# Patient Record
Sex: MALE | Race: ASIAN | Employment: OTHER | URBAN - METROPOLITAN AREA
[De-identification: names, ages, dates, MRNs, and addresses within clinical notes are randomized per-mention and may not be internally consistent; named-entity substitution may affect disease eponyms.]

---

## 2017-11-16 ENCOUNTER — HOSPITAL ENCOUNTER (OUTPATIENT)
Dept: CV DIAGNOSTICS | Facility: HOSPITAL | Age: 72
Discharge: HOME | End: 2017-11-16
Attending: SPECIALIST
Payer: MEDICARE

## 2017-11-16 DIAGNOSIS — I34.0 MITRAL INCOMPETENCE: ICD-10-CM

## 2017-11-16 PROCEDURE — 93306 TTE W/DOPPLER COMPLETE: CPT | Performed by: SPECIALIST

## 2017-12-22 ENCOUNTER — HOSPITAL ENCOUNTER (OUTPATIENT)
Dept: BONE DENSITY | Age: 72
Discharge: HOME | End: 2017-12-22
Attending: INTERNAL MEDICINE
Payer: MEDICARE

## 2017-12-22 DIAGNOSIS — M81.0 OSTEOPOROSIS: ICD-10-CM

## 2017-12-22 PROCEDURE — 77080 DXA BONE DENSITY AXIAL: CPT | Performed by: INTERNAL MEDICINE

## 2018-06-26 ENCOUNTER — HOSPITAL ENCOUNTER (OUTPATIENT)
Dept: CV DIAGNOSTICS | Facility: HOSPITAL | Age: 73
Discharge: HOME | End: 2018-06-26
Attending: FAMILY MEDICINE
Payer: MEDICARE

## 2018-06-26 DIAGNOSIS — R06.02 SHORTNESS OF BREATH: ICD-10-CM

## 2018-06-26 DIAGNOSIS — R07.9 CHEST PAIN: ICD-10-CM

## 2018-06-26 PROCEDURE — 93306 TTE W/DOPPLER COMPLETE: CPT | Performed by: FAMILY MEDICINE

## 2019-09-27 ENCOUNTER — HOSPITAL ENCOUNTER (OUTPATIENT)
Dept: CV DIAGNOSTICS | Facility: HOSPITAL | Age: 74
Discharge: HOME | End: 2019-09-27
Attending: THORACIC SURGERY (CARDIOTHORACIC VASCULAR SURGERY)
Payer: MEDICARE

## 2019-09-27 DIAGNOSIS — I07.1 TRICUSPID REGURGITATION: ICD-10-CM

## 2019-09-27 PROCEDURE — 93306 TTE W/DOPPLER COMPLETE: CPT | Performed by: THORACIC SURGERY (CARDIOTHORACIC VASCULAR SURGERY)

## 2019-12-19 ENCOUNTER — HOSPITAL ENCOUNTER (OUTPATIENT)
Dept: CV DIAGNOSTICS | Facility: HOSPITAL | Age: 74
Discharge: HOME | End: 2019-12-19
Attending: SPECIALIST
Payer: MEDICARE

## 2019-12-19 DIAGNOSIS — Z95.2 S/P MVR (MITRAL VALVE REPLACEMENT): ICD-10-CM

## 2019-12-19 DIAGNOSIS — I25.10 CAD (CORONARY ARTERY DISEASE): ICD-10-CM

## 2019-12-19 PROCEDURE — 78452 HT MUSCLE IMAGE SPECT MULT: CPT | Performed by: SPECIALIST

## 2019-12-19 PROCEDURE — 93018 CV STRESS TEST I&R ONLY: CPT | Performed by: SPECIALIST

## 2019-12-19 PROCEDURE — 93017 CV STRESS TEST TRACING ONLY: CPT | Performed by: SPECIALIST

## 2020-09-30 ENCOUNTER — HOSPITAL ENCOUNTER (OUTPATIENT)
Dept: CV DIAGNOSTICS | Facility: HOSPITAL | Age: 75
Discharge: HOME | End: 2020-09-30
Attending: SPECIALIST
Payer: MEDICARE

## 2020-09-30 DIAGNOSIS — I34.0 MITRAL VALVE INSUFFICIENCY, UNSPECIFIED ETIOLOGY: ICD-10-CM

## 2020-09-30 PROCEDURE — 93306 TTE W/DOPPLER COMPLETE: CPT | Performed by: SPECIALIST

## 2021-09-20 ENCOUNTER — HOSPITAL ENCOUNTER (OUTPATIENT)
Dept: CV DIAGNOSTICS | Facility: HOSPITAL | Age: 76
Discharge: HOME | End: 2021-09-20
Attending: SPECIALIST
Payer: MEDICARE

## 2021-09-20 DIAGNOSIS — Z98.890 S/P MVR (MITRAL VALVE REPAIR): ICD-10-CM

## 2021-09-20 DIAGNOSIS — Z95.4 PRESENCE OF OTHER HEART-VALVE REPLACEMENT: ICD-10-CM

## 2021-09-20 PROCEDURE — 93306 TTE W/DOPPLER COMPLETE: CPT | Performed by: SPECIALIST

## 2021-09-22 ENCOUNTER — HOSPITAL ENCOUNTER (OUTPATIENT)
Dept: CV DIAGNOSTICS | Facility: HOSPITAL | Age: 76
Discharge: HOME | End: 2021-09-22
Attending: SPECIALIST
Payer: MEDICARE

## 2021-09-22 DIAGNOSIS — Z98.890 S/P MVR (MITRAL VALVE REPAIR): ICD-10-CM

## 2021-09-22 DIAGNOSIS — R06.02 SOB (SHORTNESS OF BREATH): ICD-10-CM

## 2021-09-22 DIAGNOSIS — I10 HTN (HYPERTENSION): ICD-10-CM

## 2021-09-22 PROCEDURE — 78452 HT MUSCLE IMAGE SPECT MULT: CPT | Performed by: SPECIALIST

## 2021-09-22 PROCEDURE — 93017 CV STRESS TEST TRACING ONLY: CPT | Performed by: SPECIALIST

## 2021-09-22 PROCEDURE — 93018 CV STRESS TEST I&R ONLY: CPT | Performed by: SPECIALIST

## 2023-06-15 ENCOUNTER — OFFICE VISIT (OUTPATIENT)
Dept: FAMILY MEDICINE CLINIC | Facility: CLINIC | Age: 78
End: 2023-06-15
Payer: MEDICARE

## 2023-06-15 VITALS
WEIGHT: 162 LBS | HEART RATE: 66 BPM | RESPIRATION RATE: 18 BRPM | DIASTOLIC BLOOD PRESSURE: 64 MMHG | BODY MASS INDEX: 26.03 KG/M2 | TEMPERATURE: 97 F | HEIGHT: 66 IN | SYSTOLIC BLOOD PRESSURE: 130 MMHG | OXYGEN SATURATION: 100 %

## 2023-06-15 DIAGNOSIS — J02.9 SORE THROAT: ICD-10-CM

## 2023-06-15 DIAGNOSIS — R09.89 RUNNY NOSE: ICD-10-CM

## 2023-06-15 DIAGNOSIS — R05.1 ACUTE COUGH: Primary | ICD-10-CM

## 2023-06-15 PROBLEM — I10 ESSENTIAL HYPERTENSION, BENIGN: Status: ACTIVE | Noted: 2019-06-03

## 2023-06-15 PROBLEM — D62 ACUTE BLOOD LOSS AS CAUSE OF POSTOPERATIVE ANEMIA: Status: ACTIVE | Noted: 2019-06-04

## 2023-06-15 PROBLEM — N40.1 LOWER URINARY TRACT SYMPTOMS DUE TO BENIGN PROSTATIC HYPERPLASIA: Status: ACTIVE | Noted: 2023-06-15

## 2023-06-15 PROBLEM — K21.9 GASTROESOPHAGEAL REFLUX DISEASE WITHOUT ESOPHAGITIS: Status: ACTIVE | Noted: 2019-06-03

## 2023-06-15 PROBLEM — H57.89 IRRITATION OF BOTH EYES: Status: ACTIVE | Noted: 2019-06-03

## 2023-06-15 PROBLEM — I25.10 ATHEROSCLEROSIS OF CORONARY ARTERY: Status: ACTIVE | Noted: 2023-06-15

## 2023-06-15 PROBLEM — R39.11 HESITANCY OF MICTURITION: Status: ACTIVE | Noted: 2023-06-15

## 2023-06-15 PROBLEM — E55.9 VITAMIN D DEFICIENCY: Status: ACTIVE | Noted: 2023-06-15

## 2023-06-15 PROBLEM — E78.5 HYPERLIPIDEMIA: Status: ACTIVE | Noted: 2023-06-15

## 2023-06-15 PROBLEM — N40.0 BENIGN PROSTATIC HYPERPLASIA: Status: ACTIVE | Noted: 2023-06-15

## 2023-06-15 PROBLEM — M54.30 SCIATICA: Status: ACTIVE | Noted: 2023-06-15

## 2023-06-15 LAB
CONTROL LINE PRESENT WITH A CLEAR BACKGROUND (YES/NO): YES YES/NO
KIT LOT #: NORMAL NUMERIC
STREP GRP A CUL-SCR: NEGATIVE

## 2023-06-15 PROCEDURE — 87635 SARS-COV-2 COVID-19 AMP PRB: CPT | Performed by: NURSE PRACTITIONER

## 2023-06-15 PROCEDURE — 87880 STREP A ASSAY W/OPTIC: CPT | Performed by: NURSE PRACTITIONER

## 2023-06-15 PROCEDURE — 99202 OFFICE O/P NEW SF 15 MIN: CPT | Performed by: NURSE PRACTITIONER

## 2023-06-15 RX ORDER — AZITHROMYCIN 250 MG/1
TABLET, FILM COATED ORAL
Qty: 6 TABLET | Refills: 0 | Status: SHIPPED | OUTPATIENT
Start: 2023-06-15 | End: 2023-06-20

## 2023-06-16 DIAGNOSIS — Z20.822 ENCOUNTER FOR LABORATORY TESTING FOR COVID-19 VIRUS: Primary | ICD-10-CM

## 2023-06-16 LAB — SARS-COV-2 RNA RESP QL NAA+PROBE: DETECTED

## 2023-12-21 ENCOUNTER — HOSPITAL ENCOUNTER (OUTPATIENT)
Dept: GENERAL RADIOLOGY | Age: 78
Discharge: HOME | End: 2023-12-21
Payer: MEDICARE

## 2023-12-21 DIAGNOSIS — R52 PAIN: ICD-10-CM

## 2023-12-21 PROCEDURE — 71101 X-RAY EXAM UNILAT RIBS/CHEST: CPT

## 2024-01-26 ENCOUNTER — HOSPITAL ENCOUNTER (INPATIENT)
Facility: HOSPITAL | Age: 79
LOS: 4 days | Discharge: HOME HEALTH | End: 2024-01-30
Attending: EMERGENCY MEDICINE | Admitting: HOSPITALIST
Payer: MEDICARE

## 2024-01-26 ENCOUNTER — APPOINTMENT (OUTPATIENT)
Dept: GENERAL RADIOLOGY | Facility: HOSPITAL | Age: 79
End: 2024-01-26
Payer: MEDICARE

## 2024-01-26 DIAGNOSIS — E87.1 HYPONATREMIA: ICD-10-CM

## 2024-01-26 DIAGNOSIS — J18.9 COMMUNITY ACQUIRED PNEUMONIA OF RIGHT LUNG, UNSPECIFIED PART OF LUNG: Primary | ICD-10-CM

## 2024-01-26 DIAGNOSIS — M54.30 SCIATICA, UNSPECIFIED LATERALITY: ICD-10-CM

## 2024-01-26 PROBLEM — K21.9 GASTROESOPHAGEAL REFLUX DISEASE: Status: ACTIVE | Noted: 2019-06-03

## 2024-01-26 PROBLEM — I10 BENIGN ESSENTIAL HTN: Status: ACTIVE | Noted: 2019-06-03

## 2024-01-26 PROBLEM — E78.5 DYSLIPIDEMIA: Status: ACTIVE | Noted: 2023-06-15

## 2024-01-26 LAB
ALBUMIN SERPL-MCNC: 2.7 G/DL (ref 3.4–5)
ALBUMIN/GLOB SERPL: 0.6 {RATIO} (ref 1–2)
ALP LIVER SERPL-CCNC: 123 U/L
ANION GAP SERPL CALC-SCNC: 7 MMOL/L (ref 0–18)
ANION GAP SERPL CALC-SCNC: 7 MMOL/L (ref 0–18)
AST SERPL-CCNC: 65 U/L (ref 15–37)
BASOPHILS # BLD AUTO: 0.03 X10(3) UL (ref 0–0.2)
BASOPHILS NFR BLD AUTO: 0.3 %
BILIRUB SERPL-MCNC: 0.8 MG/DL (ref 0.1–2)
BUN BLD-MCNC: 18 MG/DL (ref 9–23)
BUN BLD-MCNC: 20 MG/DL (ref 9–23)
CALCIUM BLD-MCNC: 8.5 MG/DL (ref 8.5–10.1)
CALCIUM BLD-MCNC: 9.1 MG/DL (ref 8.5–10.1)
CHLORIDE SERPL-SCNC: 85 MMOL/L (ref 98–112)
CHLORIDE SERPL-SCNC: 87 MMOL/L (ref 98–112)
CO2 SERPL-SCNC: 25 MMOL/L (ref 21–32)
CO2 SERPL-SCNC: 25 MMOL/L (ref 21–32)
CREAT BLD-MCNC: 0.92 MG/DL
CREAT BLD-MCNC: 0.94 MG/DL
EGFRCR SERPLBLD CKD-EPI 2021: 83 ML/MIN/1.73M2 (ref 60–?)
EGFRCR SERPLBLD CKD-EPI 2021: 86 ML/MIN/1.73M2 (ref 60–?)
EOSINOPHIL # BLD AUTO: 0.13 X10(3) UL (ref 0–0.7)
EOSINOPHIL NFR BLD AUTO: 1.2 %
ERYTHROCYTE [DISTWIDTH] IN BLOOD BY AUTOMATED COUNT: 14.3 %
FLUAV + FLUBV RNA SPEC NAA+PROBE: NEGATIVE
FLUAV + FLUBV RNA SPEC NAA+PROBE: NEGATIVE
GLOBULIN PLAS-MCNC: 4.5 G/DL (ref 2.8–4.4)
GLUCOSE BLD-MCNC: 79 MG/DL (ref 70–99)
GLUCOSE BLD-MCNC: 80 MG/DL (ref 70–99)
HCT VFR BLD AUTO: 29.4 %
HGB BLD-MCNC: 10.2 G/DL
IMM GRANULOCYTES # BLD AUTO: 0.18 X10(3) UL (ref 0–1)
IMM GRANULOCYTES NFR BLD: 1.6 %
L PNEUMO AG UR QL: NEGATIVE
LACTATE SERPL-SCNC: 1.2 MMOL/L (ref 0.4–2)
LYMPHOCYTES # BLD AUTO: 0.75 X10(3) UL (ref 1–4)
LYMPHOCYTES NFR BLD AUTO: 6.7 %
MCH RBC QN AUTO: 27.1 PG (ref 26–34)
MCHC RBC AUTO-ENTMCNC: 34.7 G/DL (ref 31–37)
MCV RBC AUTO: 78 FL
MONOCYTES # BLD AUTO: 0.57 X10(3) UL (ref 0.1–1)
MONOCYTES NFR BLD AUTO: 5.1 %
MRSA DNA SPEC QL NAA+PROBE: NEGATIVE
NEUTROPHILS # BLD AUTO: 9.58 X10 (3) UL (ref 1.5–7.7)
NEUTROPHILS # BLD AUTO: 9.58 X10(3) UL (ref 1.5–7.7)
NEUTROPHILS NFR BLD AUTO: 85.1 %
NT-PROBNP SERPL-MCNC: 4037 PG/ML (ref ?–450)
OSMOLALITY SERPL CALC.SUM OF ELEC: 246 MOSM/KG (ref 275–295)
OSMOLALITY SERPL CALC.SUM OF ELEC: 249 MOSM/KG (ref 275–295)
OSMOLALITY UR: 359 MOSM/KG (ref 300–1300)
PLATELET # BLD AUTO: 278 10(3)UL (ref 150–450)
POTASSIUM SERPL-SCNC: 4.1 MMOL/L (ref 3.5–5.1)
POTASSIUM SERPL-SCNC: 4.4 MMOL/L (ref 3.5–5.1)
PROCALCITONIN SERPL-MCNC: 0.08 NG/ML (ref ?–0.16)
PROT SERPL-MCNC: 7.2 G/DL (ref 6.4–8.2)
RBC # BLD AUTO: 3.77 X10(6)UL
RSV RNA SPEC NAA+PROBE: NEGATIVE
SARS-COV-2 RNA RESP QL NAA+PROBE: NOT DETECTED
SODIUM SERPL-SCNC: 117 MMOL/L (ref 136–145)
SODIUM SERPL-SCNC: 119 MMOL/L (ref 136–145)
SODIUM SERPL-SCNC: 43 MMOL/L
STREP PNEUMO ANTIGEN, URINE: NEGATIVE
TROPONIN I SERPL HS-MCNC: 19 NG/L
WBC # BLD AUTO: 11.2 X10(3) UL (ref 4–11)

## 2024-01-26 PROCEDURE — 71045 X-RAY EXAM CHEST 1 VIEW: CPT | Performed by: EMERGENCY MEDICINE

## 2024-01-26 PROCEDURE — 99223 1ST HOSP IP/OBS HIGH 75: CPT | Performed by: INTERNAL MEDICINE

## 2024-01-26 RX ORDER — ACETAMINOPHEN 500 MG
500 TABLET ORAL EVERY 4 HOURS PRN
Status: DISCONTINUED | OUTPATIENT
Start: 2024-01-26 | End: 2024-01-30

## 2024-01-26 RX ORDER — ONDANSETRON 2 MG/ML
4 INJECTION INTRAMUSCULAR; INTRAVENOUS EVERY 4 HOURS PRN
Status: DISCONTINUED | OUTPATIENT
Start: 2024-01-26 | End: 2024-01-26 | Stop reason: ALTCHOICE

## 2024-01-26 RX ORDER — ENALAPRIL MALEATE 5 MG/1
5 TABLET ORAL DAILY
Status: DISCONTINUED | OUTPATIENT
Start: 2024-01-27 | End: 2024-01-29

## 2024-01-26 RX ORDER — TIZANIDINE 4 MG/1
4 TABLET ORAL 3 TIMES DAILY PRN
Status: DISCONTINUED | OUTPATIENT
Start: 2024-01-26 | End: 2024-01-30

## 2024-01-26 RX ORDER — HEPARIN SODIUM 5000 [USP'U]/ML
5000 INJECTION, SOLUTION INTRAVENOUS; SUBCUTANEOUS EVERY 8 HOURS SCHEDULED
Status: DISCONTINUED | OUTPATIENT
Start: 2024-01-26 | End: 2024-01-30

## 2024-01-26 RX ORDER — PANTOPRAZOLE SODIUM 40 MG/1
40 TABLET, DELAYED RELEASE ORAL
Status: DISCONTINUED | OUTPATIENT
Start: 2024-01-27 | End: 2024-01-30

## 2024-01-26 RX ORDER — DOXEPIN HYDROCHLORIDE 50 MG/1
1 CAPSULE ORAL DAILY
Status: DISCONTINUED | OUTPATIENT
Start: 2024-01-26 | End: 2024-01-30

## 2024-01-26 RX ORDER — ENEMA 19; 7 G/133ML; G/133ML
1 ENEMA RECTAL ONCE AS NEEDED
Status: DISCONTINUED | OUTPATIENT
Start: 2024-01-26 | End: 2024-01-30

## 2024-01-26 RX ORDER — SODIUM CHLORIDE 9 MG/ML
INJECTION, SOLUTION INTRAVENOUS CONTINUOUS
Status: DISCONTINUED | OUTPATIENT
Start: 2024-01-26 | End: 2024-01-29

## 2024-01-26 RX ORDER — ATORVASTATIN CALCIUM 40 MG/1
40 TABLET, FILM COATED ORAL NIGHTLY
Status: DISCONTINUED | OUTPATIENT
Start: 2024-01-26 | End: 2024-01-30

## 2024-01-26 RX ORDER — SODIUM CHLORIDE 9 MG/ML
INJECTION, SOLUTION INTRAVENOUS ONCE
Status: COMPLETED | OUTPATIENT
Start: 2024-01-26 | End: 2024-01-26

## 2024-01-26 RX ORDER — MELATONIN 10 MG
10 CAPSULE ORAL NIGHTLY PRN
COMMUNITY

## 2024-01-26 RX ORDER — BISACODYL 10 MG
10 SUPPOSITORY, RECTAL RECTAL
Status: DISCONTINUED | OUTPATIENT
Start: 2024-01-26 | End: 2024-01-30

## 2024-01-26 RX ORDER — SENNOSIDES 8.6 MG
17.2 TABLET ORAL NIGHTLY PRN
Status: DISCONTINUED | OUTPATIENT
Start: 2024-01-26 | End: 2024-01-30

## 2024-01-26 RX ORDER — METOCLOPRAMIDE HYDROCHLORIDE 5 MG/ML
10 INJECTION INTRAMUSCULAR; INTRAVENOUS EVERY 8 HOURS PRN
Status: DISCONTINUED | OUTPATIENT
Start: 2024-01-26 | End: 2024-01-30

## 2024-01-26 RX ORDER — SODIUM CHLORIDE 9 MG/ML
125 INJECTION, SOLUTION INTRAVENOUS CONTINUOUS
Status: DISCONTINUED | OUTPATIENT
Start: 2024-01-26 | End: 2024-01-26

## 2024-01-26 RX ORDER — ONDANSETRON 2 MG/ML
4 INJECTION INTRAMUSCULAR; INTRAVENOUS EVERY 6 HOURS PRN
Status: DISCONTINUED | OUTPATIENT
Start: 2024-01-26 | End: 2024-01-30

## 2024-01-26 RX ORDER — POLYETHYLENE GLYCOL 3350 17 G/17G
17 POWDER, FOR SOLUTION ORAL DAILY PRN
Status: DISCONTINUED | OUTPATIENT
Start: 2024-01-26 | End: 2024-01-30

## 2024-01-26 NOTE — H&P
[facility]     Sureshchan P Gondalia Patient Status:  Emergency    1945 MRN [id number]   Location [facility] EMERGENCY DEPARTMENT Attending Choco Woods*   Hosp Day # 0 PCP Nannette Maza MD     Chief Complaint: SOB    Subjective:    History of Present Illness:     Sureshchan P Gondalia is a 78 year old male with PMhx of DL, HTN, presents with SOB. Pt has had symptoms for the past few weeks. He has some assocaited dizziness. He denies any CP. He has cough as well. No F/C. No N/V/D/C or abd pain. He has not been eating much over the past few days. He states he had some rice and soup yesterday and some ensure.  No recent travel or sick contacts he is aware of. He denies any focal weakness, numbness or tingling. No vision changes or headaches. He is very weak at home and has not been ambulating much. He denies any leg edema.     History/Other:    Past Medical History:  Past Medical History:   Diagnosis Date    Arthritis     Back pain     Bloating     Flatulence/gas pain/belching     Frequent urination     Frequent use of laxatives     Heartburn     High cholesterol     History of cardiac murmur     Indigestion     Other and unspecified hyperlipidemia     Pain in joints     Presence of other cardiac implants and grafts     Shortness of breath     Unspecified essential hypertension     Wears glasses      Past Surgical History:   History reviewed. No pertinent surgical history.   Family History:   No family history on file.  Social History:    reports that he has never smoked. He has never used smokeless tobacco. He reports that he does not drink alcohol and does not use drugs.     Allergies:   Allergies   Allergen Reactions    Sulfa Antibiotics        Medications:    No current facility-administered medications on file prior to encounter.     Current Outpatient Medications on File Prior to Encounter   Medication Sig Dispense Refill    PANTOPRAZOLE 40 MG Oral Tab EC  TAKE 1 TABLET BY MOUTH TWO TIMES A DAY 30 MINUTES BEFORE MEALS 180 tablet 0    atorvastatin 40 MG Oral Tab Take 1 tablet (40 mg total) by mouth As Directed.      Cholecalciferol 125 MCG (5000 UT) Oral Tab Take 1 tablet (5,000 Units total) by mouth 2 (two) times daily.      Coenzyme Q10 300 MG Oral Cap Take by mouth.      LORazepam 1 MG Oral Tab Take 1 mg by mouth as needed.      Meloxicam 15 MG Oral Tab       Multiple Vitamins-Minerals (CENTRUM ADULTS) Oral Tab Take by mouth daily.      tiZANidine HCl 4 MG Oral Cap Take 4 mg by mouth 3 (three) times daily.      Enalapril Maleate (VASOTEC) 5 MG Oral Tab Take 1 tablet (5 mg total) by mouth daily.      Omega-3-acid Ethyl Esters 1 g Oral Cap Take 2 capsules (2 g total) by mouth 2 (two) times daily.      Esomeprazole Magnesium (NEXIUM) 40 MG Oral Capsule Delayed Release Take 1 capsule (40 mg total) by mouth every morning before breakfast.         Review of Systems:   A comprehensive review of systems was completed.    Pertinent positives and negatives noted in the HPI.    Objective:   Physical Exam:    BP (!) 125/105   Pulse 72   Temp 98.1 °F (36.7 °C) (Oral)   Resp 17   Ht 167.6 cm (5' 6\")   Wt 162 lb (73.5 kg)   SpO2 95%   BMI 26.15 kg/m²   General: No acute distress, Alert  Respiratory: R sided crackles  Cardiovascular: S1, S2. Regular rate and rhythm  Abdomen: Soft, Non-tender, non-distended, positive bowel sounds  Neuro: No new focal deficits  Extremities: No edema      Results:    Labs:      Labs Last 24 Hours:    Recent Labs   Lab 01/26/24  1536   RBC 3.77*   HGB 10.2*   HCT 29.4*   MCV 78.0*   MCH 27.1   MCHC 34.7   RDW 14.3   NEPRELIM 9.58*   WBC 11.2*   .0       Recent Labs   Lab 01/26/24  1536   GLU 80   BUN 20   CREATSERUM 0.94   EGFRCR 83   CA 9.1   ALB 2.7*   *   K 4.4   CL 85*   CO2 25.0   ALKPHO 123*   AST 65*   BILT 0.8   TP 7.2       Lab Results   Component Value Date    PT 16.0 (H) 06/16/2012    PT 18.8 (H) 06/15/2012    PT 20.6 (H)  06/14/2012    INR 1.32 (H) 06/16/2012    INR 1.63 (H) 06/15/2012    INR 1.83 (H) 06/14/2012       Recent Labs   Lab 01/26/24  1536   TROPHS 19       Recent Labs   Lab 01/26/24  1536   PBNP 4,037*       No results for input(s): \"PCT\" in the last 168 hours.    Imaging: Imaging data reviewed in Epic.    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 Please be advised this is a medical document. Medical documents are intended to carry relevant information, facts as evident, and the clinical opinion of the practitioner. The medical note is intended as peer to peer communication and may appear blunt or direct. It is written in medical language and may contain abbreviations or verbiage that are unfamiliar.

## 2024-01-26 NOTE — ED PROVIDER NOTES
Patient Seen in: [facility] Emergency Department      History     Chief Complaint   Patient presents with    Difficulty Breathing    Lightheadedness    Weakness     Stated Complaint: sob, lighheaded, generalized weakness    Subjective:   HPI    78-year-old male presents today for evaluation.  For the past 2 weeks, patient thought he had a cold.  He has had a persistent dry cough.  Over the last several days, patient has been feeling more weak.  Daughter contributes that he has been having a hard time getting up and moving around.  Patient states he feels short of breath, usually worse with exertion.  He does not have any fevers, chest pain or hemoptysis.  He does not have any leg swelling either.  Because of his weakness, he is brought in for evaluation.    Objective:   Past Medical History:   Diagnosis Date    Anxiety state     Arrhythmia     Arthritis     Back pain     Back problem     Bloating     Depression     Esophageal reflux     Flatulence/gas pain/belching     Frequent urination     Frequent use of laxatives     Heartburn     High cholesterol     History of blood transfusion     History of cardiac murmur     Indigestion     Muscle weakness     Osteoarthritis     Other and unspecified hyperlipidemia     Pain in joints     Presence of other cardiac implants and grafts     Renal disorder     Shortness of breath     Unspecified essential hypertension     Visual impairment     Wears glasses               History reviewed. No pertinent surgical history.             Social History     Socioeconomic History    Marital status:    Tobacco Use    Smoking status: Never    Smokeless tobacco: Never   Vaping Use    Vaping Use: Never used   Substance and Sexual Activity    Alcohol use: Never    Drug use: Never     Social Determinants of Health     Food Insecurity: No Food Insecurity (1/26/2024)    Food Insecurity     Food Insecurity: Never true   Transportation Needs: No Transportation Needs (1/26/2024)     Transportation Needs     Lack of Transportation: No   Housing Stability: Low Risk  (1/26/2024)    Housing Stability     Housing Instability: No              Review of Systems    Positive for stated complaint: sob, lighheaded, generalized weakness  Other systems are as noted in HPI.  Constitutional and vital signs reviewed.      All other systems reviewed and negative except as noted above.    Physical Exam     ED Triage Vitals [01/26/24 1510]   BP (!) 170/83   Pulse 78   Resp 20   Temp 98.1 °F (36.7 °C)   Temp src Oral   SpO2 100 %   O2 Device None (Room air)       Current:/76 (BP Location: Right arm)   Pulse 68   Temp 97.9 °F (36.6 °C) (Oral)   Resp 18   Ht 167.6 cm (5' 6\")   Wt 74.9 kg   SpO2 100%   BMI 26.65 kg/m²         Physical Exam  Vitals and nursing note reviewed.   Constitutional:       Appearance: Normal appearance.   HENT:      Head: Normocephalic.      Nose: Nose normal.      Mouth/Throat:      Mouth: Mucous membranes are moist.   Eyes:      Extraocular Movements: Extraocular movements intact.   Cardiovascular:      Rate and Rhythm: Normal rate.   Pulmonary:      Effort: Pulmonary effort is normal.      Comments: Right basilar crackles, lungs are otherwise clear.  Abdominal:      General: Abdomen is flat.   Musculoskeletal:         General: Normal range of motion.      Right lower leg: No edema.      Left lower leg: No edema.   Skin:     General: Skin is warm.   Neurological:      General: No focal deficit present.      Mental Status: He is alert.   Psychiatric:         Mood and Affect: Mood normal.         ED Course     Labs Reviewed   COMP METABOLIC PANEL (14) - Abnormal; Notable for the following components:       Result Value    Sodium 117 (*)     Chloride 85 (*)     Calculated Osmolality 246 (*)     AST 65 (*)     Alkaline Phosphatase 123 (*)     Albumin 2.7 (*)     Globulin  4.5 (*)     A/G Ratio 0.6 (*)     All other components within normal limits   PRO BETA NATRIURETIC PEPTIDE -  Abnormal; Notable for the following components:    Pro-Beta Natriuretic Peptide 4,037 (*)     All other components within normal limits   BASIC METABOLIC PANEL (8) - Abnormal; Notable for the following components:    Sodium 119 (*)     Chloride 87 (*)     Calculated Osmolality 249 (*)     All other components within normal limits   CBC W/ DIFFERENTIAL - Abnormal; Notable for the following components:    WBC 11.2 (*)     RBC 3.77 (*)     HGB 10.2 (*)     HCT 29.4 (*)     MCV 78.0 (*)     Neutrophil Absolute Prelim 9.58 (*)     Neutrophil Absolute 9.58 (*)     Lymphocyte Absolute 0.75 (*)     All other components within normal limits   LACTIC ACID, PLASMA - Normal   TROPONIN I HIGH SENSITIVITY - Normal   PROCALCITONIN - Normal    Narrative:     Resulted by: batch: TNIH, BRYANT,    LEGIONELLA URINE AG SEROGRP 1 - Normal    Narrative:     Presumptive negative for Legionella pneumophila serogroup 1 antigen in urine, suggesting no recent or current infection. Infection due to Legionella cannot be ruled out since other serogroups and species may cause disease, antigen may not be present in early infection, or the level of antigen present in the urine may be below the detection limit of the test.   STREPTOCOCCUS PNEUMONIAE AG, URINE - Normal    Narrative:     Presumptive negative. A negative result does not exclude infection with Streptococcus pneumoniae. The result of this test as well as culture results should be used in conjunction with clinical findings to make an accurate diagnosis.   OSMOLALITY, URINE - Normal   SARS-COV-2/FLU A AND B/RSV BY PCR (GENEXP[employer]) - Normal    Narrative:     This test is intended for the qualitative detection and differentiation of SARS-CoV-2, influenza A, influenza B, and respiratory syncytial virus (RSV) viral RNA in nasopharyngeal or nares swabs from individuals suspected of respiratory viral infection consistent with COVID-19 by their healthcare provider. Signs and symptoms of respiratory  viral infection due to SARS-CoV-2, influenza, and RSV can be similar.    Test performed using the Xpert Xpress SARS-CoV-2/FLU/RSV (real time RT-PCR)  assay on the [employer] instrument, [address], [place] [address].   This test is being used under the Food and Drug Administration's Emergency Use Authorization.    The authorized Fact Sheet for Healthcare Providers for this assay is available upon request from the laboratory.   ED/MRSA SCREEN BY PCR-CC - Normal   CBC WITH DIFFERENTIAL WITH PLATELET    Narrative:     The following orders were created for panel order CBC With Differential With Platelet.  Procedure                               Abnormality         Status                     ---------                               -----------         ------                     CBC W/ DIFFERENTIAL[[id number]]          Abnormal            Final result                 Please view results for these tests on the individual orders.   SODIUM, URINE, RANDOM   COMP METABOLIC PANEL (14)   CBC WITH DIFFERENTIAL WITH PLATELET   BASIC METABOLIC PANEL (8)   BASIC METABOLIC PANEL (8)   BASIC METABOLIC PANEL (8)   BASIC METABOLIC PANEL (8)   BLOOD CULTURE   BLOOD CULTURE   SPUTUM CULTURE     EKG    Rate, intervals and axes as noted on EKG Report.  Rate: 73  Rhythm: Atrial Fibrillation  Reading: No STEMI                 XR CHEST AP PORTABLE  (CPT=71045)    Result Date: 1/26/2024  PROCEDURE:  XR CHEST AP PORTABLE  (CPT=71045)  TECHNIQUE:  AP chest radiograph was obtained.  COMPARISON:  [address], XR, XR RIBS WITH CHEST (3 VIEWS), RIGHT  (CPT=71101), 12/21/2023, 1:09 PM.  INDICATIONS:  sob, lighheaded, generalized weakness  PATIENT STATED HISTORY: (As transcribed by Technologist)  Patient stated having shortness of breath today.    FINDINGS:  There is marked cardiomegaly with mild pulmonary vascular congestion.  There are increased interstitial markings in the lungs, right greater than left.  There is patchy airspace consolidation in the  mid to lower right lung.  The overall findings are most concerning for pneumonia in the right lung, with changes of asymmetric pulmonary edema from congestive failure not entirely excluded.  Clinical correlation recommended.  Minimal right pleural effusion.  No pneumothorax.  Degenerative changes the spine.  Median sternotomy changes noted.            CONCLUSION:  There is marked cardiomegaly with mild pulmonary vascular congestion.  There are increased interstitial markings in the lungs, right greater than left.  There is patchy airspace consolidation in the mid to lower right lung.  The overall findings are most concerning for pneumonia in the right lung, with changes of asymmetric pulmonary edema from congestive failure not entirely excluded.  Clinical correlation recommended.  Minimal right pleural effusion.    LOCATION:  [facility]      Dictated by (CST): Abdiaziz Lombardi MD on 1/26/2024 at 4:33 PM     Finalized by (CST): Abdiaziz Lombardi MD on 1/26/2024 at 4:34 PM               MDM      Differential Diagnosis  78-year-old male presents today for evaluation of several weeks of a cough that is now progressed to generalized weakness and shortness of breath with exertion.  He is satting well on room air and has no increased work of breathing.  On auscultation, he does have right basilar crackles.  Differential would include bronchitis, pneumonia, CHF, acute coronary syndrome.  Plan for chest x-ray along with labs.  Will give fluids and reassess.    4:39 pm  Patient noted to have critical hyponatremia.  I reviewed case with Dr. Hoffman of nephrology.  Patient had only received about 50 mL of normal saline.  Recommendation was to place him on a rate of 75 ml/hr and fluid restrict, urine sodium and osm ordered at request.  Chest x-ray demonstrates a right-sided infiltrate, and concern for pneumonia.  IV antibiotics ordered.    6:09 pm  Patient remains hemodynamically stable and well-appearing.  Will admit to the hospital  for continued care.  I spoke with the hospitalist in regards to admission.    A total of 30 minutes of critical care time (exclusive of billable procedures) was administered to manage the patient's critical lab values due to his hyponatremia.  This involved direct patient intervention, complex decision making, and/or extensive discussions with the patient, family, and clinical staff.    History from [facility]  Daughter helps supplement history    Discussions of Management  Case reviewed with nephrology along with the hospitalist    Independent Interpretation  I independently interpreted the x-ray, patient with a right-sided infiltrate  Admission disposition: 1/26/2024  6:09 PM                                        Medical Decision Making      Disposition and Plan     Clinical Impression:  1. Community acquired pneumonia of right lung, unspecified part of lung    2. Hyponatremia         Disposition:  Admit  1/26/2024  6:09 pm    Follow-up:  No follow-up provider specified.        Medications Prescribed:  Current Discharge Medication List                            Hospital Problems       Present on Admission  Date Reviewed: 6/15/2023            ICD-10-CM Noted POA    * (Principal) Community acquired pneumonia of right lung, unspecified part of lung J18.9 1/26/2024 Unknown    Benign essential HTN I10 6/3/2019 Yes    Dyslipidemia E78.5 6/15/2023 Yes    Gastroesophageal reflux disease K21.9 6/3/2019 Yes    Hyponatremia E87.1 1/26/2024 Unknown

## 2024-01-27 PROBLEM — E22.2 SIADH (SYNDROME OF INAPPROPRIATE ADH PRODUCTION) (HCC): Status: ACTIVE | Noted: 2024-01-27

## 2024-01-27 LAB
ADENOVIRUS PCR:: NOT DETECTED
ALBUMIN SERPL-MCNC: 2.4 G/DL (ref 3.4–5)
ALBUMIN/GLOB SERPL: 0.6 {RATIO} (ref 1–2)
ALP LIVER SERPL-CCNC: 109 U/L
ANION GAP SERPL CALC-SCNC: 10 MMOL/L (ref 0–18)
ANION GAP SERPL CALC-SCNC: 8 MMOL/L (ref 0–18)
ANION GAP SERPL CALC-SCNC: 9 MMOL/L (ref 0–18)
AST SERPL-CCNC: 57 U/L (ref 15–37)
B PARAPERT DNA SPEC QL NAA+PROBE: NOT DETECTED
B PERT DNA SPEC QL NAA+PROBE: NOT DETECTED
BASOPHILS # BLD AUTO: 0.02 X10(3) UL (ref 0–0.2)
BASOPHILS NFR BLD AUTO: 0.2 %
BILIRUB SERPL-MCNC: 0.8 MG/DL (ref 0.1–2)
BUN BLD-MCNC: 13 MG/DL (ref 9–23)
BUN BLD-MCNC: 15 MG/DL (ref 9–23)
BUN BLD-MCNC: 15 MG/DL (ref 9–23)
BUN BLD-MCNC: 16 MG/DL (ref 9–23)
BUN BLD-MCNC: 19 MG/DL (ref 9–23)
C PNEUM DNA SPEC QL NAA+PROBE: NOT DETECTED
CALCIUM BLD-MCNC: 8 MG/DL (ref 8.5–10.1)
CALCIUM BLD-MCNC: 8.1 MG/DL (ref 8.5–10.1)
CALCIUM BLD-MCNC: 8.6 MG/DL (ref 8.5–10.1)
CALCIUM BLD-MCNC: 8.7 MG/DL (ref 8.5–10.1)
CALCIUM BLD-MCNC: 8.7 MG/DL (ref 8.5–10.1)
CHLORIDE SERPL-SCNC: 87 MMOL/L (ref 98–112)
CHLORIDE SERPL-SCNC: 88 MMOL/L (ref 98–112)
CHLORIDE SERPL-SCNC: 93 MMOL/L (ref 98–112)
CO2 SERPL-SCNC: 22 MMOL/L (ref 21–32)
CO2 SERPL-SCNC: 24 MMOL/L (ref 21–32)
CO2 SERPL-SCNC: 25 MMOL/L (ref 21–32)
CO2 SERPL-SCNC: 25 MMOL/L (ref 21–32)
CO2 SERPL-SCNC: 26 MMOL/L (ref 21–32)
CORONAVIRUS 229E PCR:: NOT DETECTED
CORONAVIRUS HKU1 PCR:: NOT DETECTED
CORONAVIRUS NL63 PCR:: NOT DETECTED
CORONAVIRUS OC43 PCR:: NOT DETECTED
CREAT BLD-MCNC: 0.81 MG/DL
CREAT BLD-MCNC: 0.81 MG/DL
CREAT BLD-MCNC: 0.84 MG/DL
CREAT BLD-MCNC: 0.89 MG/DL
CREAT BLD-MCNC: 0.92 MG/DL
DEPRECATED HBV CORE AB SER IA-ACNC: 194.9 NG/ML
EGFRCR SERPLBLD CKD-EPI 2021: 86 ML/MIN/1.73M2 (ref 60–?)
EGFRCR SERPLBLD CKD-EPI 2021: 88 ML/MIN/1.73M2 (ref 60–?)
EGFRCR SERPLBLD CKD-EPI 2021: 90 ML/MIN/1.73M2 (ref 60–?)
EGFRCR SERPLBLD CKD-EPI 2021: 91 ML/MIN/1.73M2 (ref 60–?)
EGFRCR SERPLBLD CKD-EPI 2021: 91 ML/MIN/1.73M2 (ref 60–?)
EOSINOPHIL # BLD AUTO: 0.18 X10(3) UL (ref 0–0.7)
EOSINOPHIL NFR BLD AUTO: 2 %
ERYTHROCYTE [DISTWIDTH] IN BLOOD BY AUTOMATED COUNT: 14.5 %
FLUAV RNA SPEC QL NAA+PROBE: NOT DETECTED
FLUBV RNA SPEC QL NAA+PROBE: NOT DETECTED
GLOBULIN PLAS-MCNC: 4 G/DL (ref 2.8–4.4)
GLUCOSE BLD-MCNC: 119 MG/DL (ref 70–99)
GLUCOSE BLD-MCNC: 127 MG/DL (ref 70–99)
GLUCOSE BLD-MCNC: 145 MG/DL (ref 70–99)
GLUCOSE BLD-MCNC: 90 MG/DL (ref 70–99)
GLUCOSE BLD-MCNC: 90 MG/DL (ref 70–99)
HCT VFR BLD AUTO: 28 %
HGB BLD-MCNC: 9.7 G/DL
IMM GRANULOCYTES # BLD AUTO: 0.16 X10(3) UL (ref 0–1)
IMM GRANULOCYTES NFR BLD: 1.8 %
IRON SATN MFR SERPL: 9 %
IRON SERPL-MCNC: 29 UG/DL
LYMPHOCYTES # BLD AUTO: 0.81 X10(3) UL (ref 1–4)
LYMPHOCYTES NFR BLD AUTO: 9.1 %
MCH RBC QN AUTO: 26.8 PG (ref 26–34)
MCHC RBC AUTO-ENTMCNC: 34.6 G/DL (ref 31–37)
MCV RBC AUTO: 77.3 FL
METAPNEUMOVIRUS PCR:: NOT DETECTED
MONOCYTES # BLD AUTO: 0.62 X10(3) UL (ref 0.1–1)
MONOCYTES NFR BLD AUTO: 7 %
MYCOPLASMA PNEUMONIA PCR:: NOT DETECTED
NEUTROPHILS # BLD AUTO: 7.1 X10 (3) UL (ref 1.5–7.7)
NEUTROPHILS # BLD AUTO: 7.1 X10(3) UL (ref 1.5–7.7)
NEUTROPHILS NFR BLD AUTO: 79.9 %
OSMOLALITY SERPL CALC.SUM OF ELEC: 250 MOSM/KG (ref 275–295)
OSMOLALITY SERPL CALC.SUM OF ELEC: 250 MOSM/KG (ref 275–295)
OSMOLALITY SERPL CALC.SUM OF ELEC: 255 MOSM/KG (ref 275–295)
OSMOLALITY SERPL CALC.SUM OF ELEC: 258 MOSM/KG (ref 275–295)
OSMOLALITY SERPL CALC.SUM OF ELEC: 260 MOSM/KG (ref 275–295)
PARAINFLUENZA 1 PCR:: NOT DETECTED
PARAINFLUENZA 2 PCR:: NOT DETECTED
PARAINFLUENZA 3 PCR:: NOT DETECTED
PARAINFLUENZA 4 PCR:: NOT DETECTED
PLATELET # BLD AUTO: 270 10(3)UL (ref 150–450)
POTASSIUM SERPL-SCNC: 3.6 MMOL/L (ref 3.5–5.1)
POTASSIUM SERPL-SCNC: 3.7 MMOL/L (ref 3.5–5.1)
POTASSIUM SERPL-SCNC: 3.7 MMOL/L (ref 3.5–5.1)
POTASSIUM SERPL-SCNC: 3.9 MMOL/L (ref 3.5–5.1)
POTASSIUM SERPL-SCNC: 4.1 MMOL/L (ref 3.5–5.1)
PROT SERPL-MCNC: 6.4 G/DL (ref 6.4–8.2)
Q-T INTERVAL: 404 MS
QRS DURATION: 88 MS
QTC CALCULATION (BEZET): 445 MS
R AXIS: 3 DEGREES
RBC # BLD AUTO: 3.62 X10(6)UL
RHINOVIRUS/ENTERO PCR:: NOT DETECTED
RSV RNA SPEC QL NAA+PROBE: NOT DETECTED
SARS-COV-2 RNA NPH QL NAA+NON-PROBE: NOT DETECTED
SODIUM SERPL-SCNC: 120 MMOL/L (ref 136–145)
SODIUM SERPL-SCNC: 120 MMOL/L (ref 136–145)
SODIUM SERPL-SCNC: 121 MMOL/L (ref 136–145)
SODIUM SERPL-SCNC: 123 MMOL/L (ref 136–145)
SODIUM SERPL-SCNC: 123 MMOL/L (ref 136–145)
T AXIS: 41 DEGREES
TIBC SERPL-MCNC: 316 UG/DL (ref 240–450)
TRANSFERRIN SERPL-MCNC: 212 MG/DL (ref 200–360)
VENTRICULAR RATE: 73 BPM
WBC # BLD AUTO: 8.9 X10(3) UL (ref 4–11)

## 2024-01-27 PROCEDURE — 99233 SBSQ HOSP IP/OBS HIGH 50: CPT | Performed by: INTERNAL MEDICINE

## 2024-01-27 PROCEDURE — 99223 1ST HOSP IP/OBS HIGH 75: CPT | Performed by: INTERNAL MEDICINE

## 2024-01-27 RX ORDER — TRAMADOL HYDROCHLORIDE 50 MG/1
50 TABLET ORAL EVERY 6 HOURS PRN
Status: DISCONTINUED | OUTPATIENT
Start: 2024-01-27 | End: 2024-01-30

## 2024-01-27 RX ORDER — AZITHROMYCIN 250 MG/1
500 TABLET, FILM COATED ORAL
Status: COMPLETED | OUTPATIENT
Start: 2024-01-27 | End: 2024-01-28

## 2024-01-27 RX ORDER — LORAZEPAM 1 MG/1
1 TABLET ORAL 2 TIMES DAILY PRN
Status: DISCONTINUED | OUTPATIENT
Start: 2024-01-27 | End: 2024-01-30

## 2024-01-27 RX ORDER — FUROSEMIDE 20 MG/1
20 TABLET ORAL ONCE
Status: COMPLETED | OUTPATIENT
Start: 2024-01-27 | End: 2024-01-27

## 2024-01-27 RX ORDER — BENZONATATE 100 MG/1
100 CAPSULE ORAL 3 TIMES DAILY PRN
Status: DISCONTINUED | OUTPATIENT
Start: 2024-01-27 | End: 2024-01-30

## 2024-01-27 NOTE — CONSULTS
[facility]  Report of Consultation    Sureshchan P Gondalia Patient Status:  Inpatient    1945 MRN [id number]   Location [facility] 5NW-A Attending Bacilio Weaver,    Hosp Day # 1 PCP Nannette Maza MD     Reason for Consultation:  Hyponatremia      History of Present Illness:  Sureshchan P Gondalia is a a(n) 78 year old male with  hx of HTN, HL who presents to the ER w/ complain of weakness. Pt able to provide hx. Daughter @ bedside  Reports he has been feeling a bit off for few days- poor PO intake for last 3 days  Denies any n/v  No diarrhea  No cp  No f/c  Baseline Na is a bit lower ~ low 130s  Denies any diuretic use  No anti seizure med/no SSRIs; takes prn xanax      History:  Past Medical History:   Diagnosis Date    Anxiety state     Arrhythmia     Arthritis     Back pain     Back problem     Bloating     Depression     Esophageal reflux     Flatulence/gas pain/belching     Frequent urination     Frequent use of laxatives     Heartburn     High cholesterol     History of blood transfusion     History of cardiac murmur     Indigestion     Muscle weakness     Osteoarthritis     Other and unspecified hyperlipidemia     Pain in joints     Presence of other cardiac implants and grafts     Renal disorder     Shortness of breath     Unspecified essential hypertension     Visual impairment     Wears glasses      History reviewed. No pertinent surgical history.  History reviewed. No pertinent family history.   reports that he has never smoked. He has never used smokeless tobacco. He reports that he does not drink alcohol and does not use drugs.    Allergies:  Allergies   Allergen Reactions    Sulfa Antibiotics        Current Medications:    Current Facility-Administered Medications:     benzonatate (Tessalon) cap 100 mg, 100 mg, Oral, TID PRN    dextromethorphan-guaiFENesin (Robitussin-DM)  mg/5mL oral solution 5 mL, 5 mL, Oral, Q6H PRN    traMADol (Ultram) tab 50 mg, 50 mg, Oral, Q6H PRN     melatonin cap/tab 10 mg, 10 mg, Oral, Nightly PRN    LORazepam (Ativan) tab 1 mg, 1 mg, Oral, BID PRN    lidocaine-menthol 4-1 % patch 1 patch, 1 patch, Transdermal, Daily    iron sucrose (Venofer) 20 mg/mL injection 200 mg, 200 mg, Intravenous, Daily    furosemide (Lasix) tab 20 mg, 20 mg, Oral, Once    atorvastatin (Lipitor) tab 40 mg, 40 mg, Oral, Nightly    enalapril (Vasotec) tab 5 mg, 5 mg, Oral, Daily    pantoprazole (Protonix) DR tab 40 mg, 40 mg, Oral, QAM AC    multivitamin (Tab-A-Tammie/Beta Carotene) tab 1 tablet, 1 tablet, Oral, Daily    tiZANidine (Zanaflex) tab 4 mg, 4 mg, Oral, TID PRN    heparin (Porcine) 5000 UNIT/ML injection 5,000 Units, 5,000 Units, Subcutaneous, Q8H YAMILE    acetaminophen (Tylenol Extra Strength) tab 500 mg, 500 mg, Oral, Q4H PRN    ondansetron (Zofran) 4 MG/2ML injection 4 mg, 4 mg, Intravenous, Q6H PRN    metoclopramide (Reglan) 5 mg/mL injection 10 mg, 10 mg, Intravenous, Q8H PRN    polyethylene glycol (PEG 3350) (Miralax) 17 g oral packet 17 g, 17 g, Oral, Daily PRN    sennosides (Senokot) tab 17.2 mg, 17.2 mg, Oral, Nightly PRN    bisacodyl (Dulcolax) 10 MG rectal suppository 10 mg, 10 mg, Rectal, Daily PRN    fleet enema (Fleet) 7-19 GM/118ML rectal enema 133 mL, 1 enema, Rectal, Once PRN    piperacillin-tazobactam (Zosyn) 3.375 g in dextrose 5% 100 mL IVPB-ADDV, 3.375 g, Intravenous, Q8H    azithromycin (Zithromax) 500 mg in sodium chloride 0.9% 250mL IVPB premix, 500 mg, Intravenous, Q24H    sodium chloride 0.9% infusion, , Intravenous, Continuous  Home Medications:  No current outpatient medications on file.       Review of Systems:  See HPI; A total of 12 systems reviewed and otherwise unremarkable.    Physical Exam:  Vital signs: Blood pressure 155/75, pulse 76, temperature 97.6 °F (36.4 °C), temperature source Oral, resp. rate 22, height 5' 6\" (1.676 m), weight 165 lb 2 oz (74.9 kg), SpO2 96%.  General: No acute distress. Alert and oriented x 3.  HEENT: Moist mucous  membranes. EOM-I. PERRL  Neck: No lymphadenopathy.  No JVD. No carotid bruits.  Respiratory: Clear to auscultation bilaterally.  No wheezes. No rhonchi.  Cardiovascular: S1, S2.  Regular rate and rhythm.  No murmurs. Equal pulses   Abdomen: Soft, nontender, nondistended.  Positive bowel sounds. No rebound tenderness   Neurologic: No focal neurological deficits.   Musculoskeletal: Full range of motion of all extremities.  No swelling noted.   Integument: No lesions. No erythema.  Psychiatric: Appropriate mood and affect.    Laboratory:  Lab Results   Component Value Date    WBC 8.9 01/27/2024    HGB 9.7 01/27/2024    HCT 28.0 01/27/2024    .0 01/27/2024    CREATSERUM 0.84 01/27/2024    BUN 13 01/27/2024     01/27/2024    K 3.6 01/27/2024    CL 88 01/27/2024    CO2 26.0 01/27/2024     01/27/2024    CA 8.6 01/27/2024    ALB 2.4 01/27/2024    ALKPHO 109 01/27/2024    BILT 0.8 01/27/2024    TP 6.4 01/27/2024    AST 57 01/27/2024    ALT  01/27/2024      Comment:      Due to  backorder we are temporarily unable to offer hospital-based ALT testing at [facility].   If urgently needed, please order ALT test code [id number].   The new order will need a new venipuncture and will be sent to [facility] for testing.   The expected turnaround time will be within 24 hours.           BUN (mg/dL)   Date Value   01/27/2024 13   01/27/2024 15   01/27/2024 15   03/09/2014 11   08/10/2012 9   06/16/2012 12     CREATININE (mg/dL)   Date Value   03/09/2014 0.78   08/10/2012 1.2   06/16/2012 1.0     Creatinine (mg/dL)   Date Value   01/27/2024 0.84   01/27/2024 0.81   01/27/2024 0.81         Imaging:  Reviewed     Impression:  Hyponatremia- acute on chronic; baseline low 130s; unclear etilogy. Denies excessive fluid intake. Eats fairly well generally, although he has not been eating much for the past 3 days   Urine Na/Osm consistent w/ more of a siadh picture  Na on admit 117; improving gradually; 123 now  -  monitor rate of correction  - continue fluids; fluid restrict (hypotonic fluids to 1.5 L daily)  - will give small dose of loop diuretic along w/ fluids  - encourage PO intake- he ate his lunch pretty well  - no urgent need for tolvaptan/3% saline; appears to be mentating well   PNA- on abx; supportive care  HTN- stble  meds reviewed; cpm    Thank you for allowing me to participate in this patient's care.  Please feel free to call me with any questions or concerns.    Khari Hoffman MD  1/27/2024  2:52 PM

## 2024-01-27 NOTE — PHYSICAL THERAPY NOTE
PHYSICAL THERAPY EVALUATION - INPATIENT     Room Number: 507/507-A  Evaluation Date: 1/27/2024  Type of Evaluation: Initial  Physician Order: PT Eval and Treat    Presenting Problem: community acquired pneumonia of r lung  Co-Morbidities : DL, HTN  Reason for Therapy: Mobility Dysfunction and Discharge Planning    History related to current admission: Patient is a 78 year old male admitted on 1/26/2024 from home for weakness, SOB, dizziness.  Pt diagnosed with community acquired pneumonia of R lung.        ASSESSMENT   In this PT evaluation, the patient presents with the following impairments decrease activitiy tolerance, complaints of dizziness, decrease balance, decrease safety awareness, and SOB.  These impairments and comorbidities manifest themselves as functional limitations in independent bed mobility, transfers, stair negotiation, and gait.  The patient is below baseline and would benefit from skilled inpatient PT to address the above deficits to assist patient in returning to prior to level of function.   Functional outcome measures completed include AMPAC.  The AM-PAC '6-Clicks' Inpatient Basic Mobility Short Form was completed and this patient is demonstrating a Approx Degree of Impairment: 28.97%  degree of impairment in mobility. Research supports that patients with this level of impairment may benefit from discharge home with intermittent supervision initially and  PT/OT.    DISCHARGE RECOMMENDATIONS  PT Discharge Recommendations: Intermittent Supervision;Home with home health PT/OT    PLAN  PT Treatment Plan: Bed mobility;Endurance;Energy conservation;Patient education;Family education;Gait training;Stair training;Transfer training;Balance training  Rehab Potential : Good  Frequency (Obs): 3-5x/week  Number of Visits to Meet Established Goals: 3      CURRENT GOALS    Goal #1 Patient is able to demonstrate supine - sit EOB @ level: modified independent     Goal #2 Patient is able to demonstrate  transfers Sit to/from Stand at assistance level: modified independent     Goal #3 Patient is able to ambulate 50 feet with assist device: rolling walker at assistance level: supervision     Goal #4 Pt able to ascend/descend 2 steps with supervision.   Goal #5    Goal #6    Goal Comments: Goals established on 2024    HOME SITUATION  Type of Home: House   Home Layout: Two level;Able to live on main level  Stairs to Enter : 2     Stairs to Bedroom: 14  Railing: Yes    Lives With: Alone  Drives: Yes  Patient Owned Equipment: Rolling walker       Prior Level of [place]: per pt reports,pt was able to amb without assistive device without assist prior to 2 weeks ago. Pt has had increase dizziness and SOB, weakness. Pt has 14 steps to  2nd floor bedroom with handrail. Per pt daughter, they can move pt bed and belongings so pt can live on main level. Pt daughter also able to stay with pt overnight initially to provide assist as needed once pt is discharged from edw.  Pt denies fall hx and drives short distances prior to 3-4 weeks ago. Pt daughter lives in [place] and is an OT.      SUBJECTIVE  \"I'm short of breath.\"      OBJECTIVE  Precautions: Bed/chair alarm  Fall Risk: Standard fall risk    WEIGHT BEARING RESTRICTION  Weight Bearing Restriction: None                PAIN ASSESSMENT  Ratin  Location: chronic back pain  Management Techniques: Repositioning;Activity promotion    COGNITION  Overall Cognitive Status:  WFL - within functional limits  Safety Judgement:  decreased awareness of need for safety    RANGE OF MOTION AND STRENGTH ASSESSMENT  Upper extremity ROM and strength are within functional limits     Lower extremity ROM is within functional limits     Lower extremity strength is within functional limits       BALANCE  Static Sitting: Fair  Dynamic Sitting: Fair  Static Standing: Fair  Dynamic Standing: Fair -    ADDITIONAL TESTS                                    ACTIVITY TOLERANCE           BP: (!)  166/86             O2 WALK  Oxygen Therapy  SPO2% on Room Air at Rest: 96  SPO2% Ambulation on Room Air: 93    NEUROLOGICAL FINDINGS  Neurological Findings: None                     AM-PAC '6-Clicks' INPATIENT SHORT FORM - BASIC MOBILITY  How much difficulty does the patient currently have...  Patient Difficulty: Turning over in bed (including adjusting bedclothes, sheets and blankets)?: None   Patient Difficulty: Sitting down on and standing up from a chair with arms (e.g., wheelchair, bedside commode, etc.): None   Patient Difficulty: Moving from lying on back to sitting on the side of the bed?: None   How much help from another person does the patient currently need...   Help from Another: Moving to and from a bed to a chair (including a wheelchair)?: A Little   Help from Another: Need to walk in hospital room?: A Little   Help from Another: Climbing 3-5 steps with a railing?: A Little       AM-PAC Score:  Raw Score: 21   Approx Degree of Impairment: 28.97%   Standardized Score (AM-PAC Scale): 50.25   CMS Modifier (G-Code):     FUNCTIONAL ABILITY STATUS  Gait Assessment   Functional Mobility/Gait Assessment  Gait Assistance: Contact guard assist;Minimum assistance  Distance (ft): 50  Assistive Device: Rolling walker  Pattern: Comment (assist needed to manage RW)    Skilled Therapy Provided     Bed Mobility:  Rolling: modified indep  Supine to sit:modified indep    Sit to supine: modified indep     Transfer Mobility:  Sit to stand: supervision from EOB to RW   Stand to sit: supervision  Gait = pt amb x 50 feet with RW CGA with intermittent min assist due to RW management.      Therapist's Comments: per RN pt ok to be seen. Pt received sitting at EOB, with son-in-law and daughter at bedside. Pt reports dizziness at rest. Pt BP assessed as above. Pt had no increase or decrease in dizziness throughout session. Pt O2 sat monitored on RA. Pt reports SOB upon returning to room after amb. Pt amb to bathroom with IV  pole with SBA. Pt returns to bed and supine without assist. Pt/pt family educated on activity recommendations, safety recommendations, and questions answered. Pt bed alarm set and family in room, RN updated.    Exercise/Education Provided:  Bed mobility  Energy conservation  Functional activity tolerated  Gait training  Posture  Transfer training    Patient End of Session: In bed;Needs met;Call light within reach;RN aware of session/findings;All patient questions and concerns addressed;Alarm set;Family present      Patient Evaluation Complexity Level:  History Moderate - 1 or 2 personal factors and/or co-morbidities   Examination of body systems Low - addressing 1-2 elements   Clinical Presentation Low - Stable   Clinical Decision Making Low - Stable       PT Session Time: 25 minutes  Gait Trainin minutes

## 2024-01-27 NOTE — PROGRESS NOTES
[facility]     Hospitalist Progress Note     Kash Middletonmartin Patient Status:  Inpatient    1945 MRN [id number]   Location [facility] 5NW-A Attending Bacilio Weaver,    Hosp Day # 1 PCP Nannette Maza MD     Chief Complaint: SOB, cough, generalized weakness    Subjective:     Patient still reports SOB. No much better. Reports cough past 3 weeks. Has left sided chest pain with coughing.    Objective:    Review of Systems:   A comprehensive review of systems was completed; pertinent positive and negatives stated in subjective.    Vital signs:  Temp:  [97.7 °F (36.5 °C)-98.2 °F (36.8 °C)] 97.7 °F (36.5 °C)  Pulse:  [62-80] 68  Resp:  [17-22] 18  BP: (125-177)/() 160/88  SpO2:  [90 %-100 %] 97 %    Physical Exam:    General: No acute distress, awake and alert  Respiratory: No wheezes, rales on right side  Cardiovascular: S1, S2, regular rate and rhythm  Abdomen: Soft, Non-tender, non-distended, positive bowel sounds  Extremities: No edema    Diagnostic Data:    Labs:  Recent Labs   Lab 24  1536 24  0839   WBC 11.2* 8.9   HGB 10.2* 9.7*   MCV 78.0* 77.3*   .0 270.0     Recent Labs   Lab 24  1536 243 24  0009 24  0839   GLU 80 79 119* 90  90   BUN 20 18 19 15  15   CREATSERUM 0.94 0.92 0.89 0.81  0.81   CA 9.1 8.5 8.0* 8.7  8.7   ALB 2.7*  --   --  2.4*   * 119* 121* 120*  120*   K 4.4 4.1 3.9 3.7  3.7   CL 85* 87* 87* 87*  87*   CO2 25.0 25.0 24.0 25.0  25.0   ALKPHO 123*  --   --  109   AST 65*  --   --  57*   BILT 0.8  --   --  0.8   TP 7.2  --   --  6.4     Estimated Creatinine Clearance: 68.9 mL/min (based on SCr of 0.81 mg/dL).    Recent Labs   Lab 24  1536   TROPHS 19     No results for input(s): \"PTP\", \"INR\" in the last 168 hours.     Microbiology  No results found for this visit on 24.    Imaging: Reviewed in Epic.    Medications:    atorvastatin  40 mg Oral Nightly    enalapril  5 mg Oral Daily    pantoprazole   40 mg Oral QAM AC    multivitamin  1 tablet Oral Daily    heparin  5,000 Units Subcutaneous Q8H YAMILE    piperacillin-tazobactam  3.375 g Intravenous Q8H    azithromycin  500 mg Intravenous Q24H       Assessment & Plan:      #Generalized weakness  -PT/OT     #Pneumonia  -Follow blood cultures  -MRSA nares, urine strep/legionella negative  -PCT only 0.08 but continue empiric abx for now. If no improvement in breathing, can consider diuresis trial as findings could be asymmetric pulm edema  -Sputum cultures if able  -Check RVP    #Acute on chronic hyponatremia  -Na 117 on arrival, was 129 in April 2023  -Nephrology consult  -1.5L Fluid restriction   -Renal to eval    #Elevated proBNP  -Echo last month showed pEF and no major valvular abnormalities except moderate TR     #Ess HTN  -Resume home ACEi     #Dyslipidemia  -Statin     #GERD  -PPI     #Mild ELevated LFTs  -Trending down, hgb was 12.1 in April 2023  -Repeat CMP in AM    #Microcytic anemia  -Check iron studies  -Monitor hgb    #Hx PAF s/p cardioversion  -No longer on AC or rate control meds    #Hx of mitral regurgitation s/p bioprosthesis    Bacilio Weaver,     Supplementary Documentation:     Quality:  DVT Mechanical Prophylaxis:   SCDs,    DVT Pharmacologic Prophylaxis   Medication    heparin (Porcine) 5000 UNIT/ML injection 5,000 Units   Code Status: Full  Macario: No urinary catheter in place  ANNE: TBD    Discharge is dependent on: Clinical state, consultant recs  At this point Mr. Gondalia is expected to be discharge to: Home    The 21st Century Cures Act makes medical notes like these available to patients in the interest of transparency. Please be advised this is a medical document. Medical documents are intended to carry relevant information, facts as evident, and the clinical opinion of the practitioner. The medical note is intended as peer to peer communication and may appear blunt or direct. It is written in medical language and may contain abbreviations  or verbiage that are unfamiliar.

## 2024-01-27 NOTE — ED QUICK NOTES
Orders for admission, patient is aware of plan and ready to go upstairs. Any questions, please call ED RN Wally at extension [phone].     Patient Covid vaccination status: Unvaccinated     COVID Test Ordered in ED: SARS-CoV-2/Flu A and B/RSV by PCR (GeneXpert)    COVID Suspicion at Admission: N/A    Running Infusions:      Mental Status/LOC at time of transport: A/O x 4    Other pertinent information:   CIWA score: N/A   NIH score:  N/A

## 2024-01-27 NOTE — PLAN OF CARE
Patient alert and oriented x4   C/o right rib cage pain, MD ordered Lidocaine patch and PRN Tramadol   NSR on tele monitor, afib at times, peripheral pulses palpable, no edema present, Heparin for VTE proph   Nephrology notified of Na of 129 this morning and of BPA firing for Potassium replacement   Patient on Zithromax IV and Zosyn   0.9 NS infusing at 75 mL/hr, MD changed to 83 mL/hr   Q6hr BNP to monitor Sodium   POC discussed w/ patient   Safety precautions in place     /88 (BP Location: Left arm)   Pulse 68   Temp 97.7 °F (36.5 °C) (Oral)   Resp 18   Ht 167.6 cm (5' 6\")   Wt 74.9 kg (165 lb 2 oz)   SpO2 97%   BMI 26.65 kg/m²      Problem: Patient/Family Goals  Goal: Patient/Family Long Term Goal  Description: Patient's Long Term Goal: Home     Interventions:  - Abx, IVF, labs   - See additional Care Plan goals for specific interventions  Outcome: Progressing  Goal: Patient/Family Short Term Goal  Description: Patient's Short Term Goal: electrolyte balance     Interventions:   - FR, coverage, nephrology on consult   - See additional Care Plan goals for specific interventions  Outcome: Progressing     Problem: RESPIRATORY - ADULT  Goal: Achieves optimal ventilation and oxygenation  Description: INTERVENTIONS:  - Assess for changes in respiratory status  - Assess for changes in mentation and behavior  - Position to facilitate oxygenation and minimize respiratory effort  - Oxygen supplementation based on oxygen saturation or ABGs  - Provide Smoking Cessation handout, if applicable  - Encourage broncho-pulmonary hygiene including cough, deep breathe, Incentive Spirometry  - Assess the need for suctioning and perform as needed  - Assess and instruct to report SOB or any respiratory difficulty  - Respiratory Therapy support as indicated  - Manage/alleviate anxiety  - Monitor for signs/symptoms of CO2 retention  Outcome: Progressing     Problem: PAIN - ADULT  Goal: Verbalizes/displays adequate comfort  level or patient's stated pain goal  Description: INTERVENTIONS:  - Encourage pt to monitor pain and request assistance  - Assess pain using appropriate pain scale  - Administer analgesics based on type and severity of pain and evaluate response  - Implement non-pharmacological measures as appropriate and evaluate response  - Consider cultural and social influences on pain and pain management  - Manage/alleviate anxiety  - Utilize distraction and/or relaxation techniques  - Monitor for opioid side effects  - Notify MD/LIP if interventions unsuccessful or patient reports new pain  - Anticipate increased pain with activity and pre-medicate as appropriate  Outcome: Progressing     Problem: SAFETY ADULT - FALL  Goal: Free from fall injury  Description: INTERVENTIONS:  - Assess pt frequently for physical needs  - Identify cognitive and physical deficits and behaviors that affect risk of falls.  - [place] fall precautions as indicated by assessment.  - Educate pt/family on patient safety including physical limitations  - Instruct pt to call for assistance with activity based on assessment  - Modify environment to reduce risk of injury  - Provide assistive devices as appropriate  - Consider OT/PT consult to assist with strengthening/mobility  - Encourage toileting schedule  Outcome: Progressing     Problem: DISCHARGE PLANNING  Goal: Discharge to home or other facility with appropriate resources  Description: INTERVENTIONS:  - Identify barriers to discharge w/pt and caregiver  - Include patient/family/discharge partner in discharge planning  - Arrange for needed discharge resources and transportation as appropriate  - Identify discharge learning needs (meds, wound care, etc)  - Arrange for interpreters to assist at discharge as needed  - Consider post-discharge preferences of patient/family/discharge partner  - Complete POLST form as appropriate  - Assess patient's ability to be responsible for managing their own  health  - Refer to Case Management Department for coordinating discharge planning if the patient needs post-hospital services based on physician/LIP order or complex needs related to functional status, cognitive ability or social support system  Outcome: Progressing

## 2024-01-27 NOTE — PROGRESS NOTES
NURSING ADMISSION NOTE      Patient admitted via Cart  Oriented to room.  Safety precautions initiated.  Bed in low position.  Call light in reach.    Pt to floor accompanied by daughter. Admission navigator complete. A/O x4, but states he feels a little confused. VSS. On RA. Last , Dr Hoffman notified, see orders.

## 2024-01-28 LAB
ALBUMIN SERPL-MCNC: 2.4 G/DL (ref 3.4–5)
ALBUMIN/GLOB SERPL: 0.6 {RATIO} (ref 1–2)
ALP LIVER SERPL-CCNC: 114 U/L
ANION GAP SERPL CALC-SCNC: 5 MMOL/L (ref 0–18)
ANION GAP SERPL CALC-SCNC: 5 MMOL/L (ref 0–18)
ANION GAP SERPL CALC-SCNC: 6 MMOL/L (ref 0–18)
ANION GAP SERPL CALC-SCNC: 8 MMOL/L (ref 0–18)
AST SERPL-CCNC: 39 U/L (ref 15–37)
BASOPHILS # BLD AUTO: 0.02 X10(3) UL (ref 0–0.2)
BASOPHILS NFR BLD AUTO: 0.3 %
BILIRUB SERPL-MCNC: 0.5 MG/DL (ref 0.1–2)
BUN BLD-MCNC: 11 MG/DL (ref 9–23)
BUN BLD-MCNC: 12 MG/DL (ref 9–23)
BUN BLD-MCNC: 12 MG/DL (ref 9–23)
BUN BLD-MCNC: 16 MG/DL (ref 9–23)
CALCIUM BLD-MCNC: 8.1 MG/DL (ref 8.5–10.1)
CALCIUM BLD-MCNC: 8.6 MG/DL (ref 8.5–10.1)
CHLORIDE SERPL-SCNC: 95 MMOL/L (ref 98–112)
CHLORIDE SERPL-SCNC: 96 MMOL/L (ref 98–112)
CHLORIDE SERPL-SCNC: 97 MMOL/L (ref 98–112)
CHLORIDE SERPL-SCNC: 97 MMOL/L (ref 98–112)
CO2 SERPL-SCNC: 23 MMOL/L (ref 21–32)
CO2 SERPL-SCNC: 24 MMOL/L (ref 21–32)
CREAT BLD-MCNC: 0.87 MG/DL
CREAT BLD-MCNC: 0.97 MG/DL
CREAT BLD-MCNC: 0.97 MG/DL
CREAT BLD-MCNC: 0.98 MG/DL
EGFRCR SERPLBLD CKD-EPI 2021: 79 ML/MIN/1.73M2 (ref 60–?)
EGFRCR SERPLBLD CKD-EPI 2021: 80 ML/MIN/1.73M2 (ref 60–?)
EGFRCR SERPLBLD CKD-EPI 2021: 80 ML/MIN/1.73M2 (ref 60–?)
EGFRCR SERPLBLD CKD-EPI 2021: 89 ML/MIN/1.73M2 (ref 60–?)
EOSINOPHIL # BLD AUTO: 0.23 X10(3) UL (ref 0–0.7)
EOSINOPHIL NFR BLD AUTO: 3.2 %
ERYTHROCYTE [DISTWIDTH] IN BLOOD BY AUTOMATED COUNT: 14.6 %
GLOBULIN PLAS-MCNC: 4 G/DL (ref 2.8–4.4)
GLUCOSE BLD-MCNC: 100 MG/DL (ref 70–99)
GLUCOSE BLD-MCNC: 102 MG/DL (ref 70–99)
GLUCOSE BLD-MCNC: 102 MG/DL (ref 70–99)
GLUCOSE BLD-MCNC: 120 MG/DL (ref 70–99)
HCT VFR BLD AUTO: 28.1 %
HGB BLD-MCNC: 9.7 G/DL
IMM GRANULOCYTES # BLD AUTO: 0.15 X10(3) UL (ref 0–1)
IMM GRANULOCYTES NFR BLD: 2.1 %
LYMPHOCYTES # BLD AUTO: 1.05 X10(3) UL (ref 1–4)
LYMPHOCYTES NFR BLD AUTO: 14.8 %
MCH RBC QN AUTO: 26.9 PG (ref 26–34)
MCHC RBC AUTO-ENTMCNC: 34.5 G/DL (ref 31–37)
MCV RBC AUTO: 78.1 FL
MONOCYTES # BLD AUTO: 0.86 X10(3) UL (ref 0.1–1)
MONOCYTES NFR BLD AUTO: 12.1 %
NEUTROPHILS # BLD AUTO: 4.77 X10 (3) UL (ref 1.5–7.7)
NEUTROPHILS # BLD AUTO: 4.77 X10(3) UL (ref 1.5–7.7)
NEUTROPHILS NFR BLD AUTO: 67.5 %
OSMOLALITY SERPL CALC.SUM OF ELEC: 262 MOSM/KG (ref 275–295)
OSMOLALITY SERPL CALC.SUM OF ELEC: 263 MOSM/KG (ref 275–295)
PLATELET # BLD AUTO: 280 10(3)UL (ref 150–450)
POTASSIUM SERPL-SCNC: 3.8 MMOL/L (ref 3.5–5.1)
POTASSIUM SERPL-SCNC: 3.8 MMOL/L (ref 3.5–5.1)
POTASSIUM SERPL-SCNC: 3.9 MMOL/L (ref 3.5–5.1)
POTASSIUM SERPL-SCNC: 4.2 MMOL/L (ref 3.5–5.1)
PROT SERPL-MCNC: 6.4 G/DL (ref 6.4–8.2)
RBC # BLD AUTO: 3.6 X10(6)UL
SODIUM SERPL-SCNC: 125 MMOL/L (ref 136–145)
SODIUM SERPL-SCNC: 126 MMOL/L (ref 136–145)
SODIUM SERPL-SCNC: 126 MMOL/L (ref 136–145)
SODIUM SERPL-SCNC: 127 MMOL/L (ref 136–145)
WBC # BLD AUTO: 7.1 X10(3) UL (ref 4–11)

## 2024-01-28 PROCEDURE — 99233 SBSQ HOSP IP/OBS HIGH 50: CPT | Performed by: INTERNAL MEDICINE

## 2024-01-28 PROCEDURE — 99232 SBSQ HOSP IP/OBS MODERATE 35: CPT | Performed by: INTERNAL MEDICINE

## 2024-01-28 RX ORDER — TOLVAPTAN 15 MG/1
15 TABLET ORAL ONCE
Status: COMPLETED | OUTPATIENT
Start: 2024-01-28 | End: 2024-01-28

## 2024-01-28 RX ORDER — FUROSEMIDE 10 MG/ML
20 INJECTION INTRAMUSCULAR; INTRAVENOUS ONCE
Status: COMPLETED | OUTPATIENT
Start: 2024-01-28 | End: 2024-01-28

## 2024-01-28 NOTE — PLAN OF CARE
Problem: Hyponatremia   Data: Ax04. Seizure precautions. Telemetry -sinus rhythm. , on room air. Diminished breathe sounds. Afebrile. Lower back pain. Prn meds. Continent. IVF 0.9 Nacl @ 100 ml/hr. BMP Q6. Sodium checks.  250 ml bolus per Nephrology given. Cardiac diet. 1.5L fluid restriction. Up with sba assist.   Last sodium Na+ 125  Intervention: heparin, tramadol, IV zosyn.  Education: Medications, call light   Response: Cooperative with care       Problem: Patient/Family Goals  Goal: Patient/Family Long Term Goal  Description: Patient's Long Term Goal: Home     Interventions:  - Abx, IVF, labs   - See additional Care Plan goals for specific interventions  Outcome: Progressing  Goal: Patient/Family Short Term Goal  Description: Patient's Short Term Goal: electrolyte balance   1/27 noc: improve sodium     Interventions:   - FR, coverage, nephrology on consult   - See additional Care Plan goals for specific interventions  Outcome: Progressing     Problem: RESPIRATORY - ADULT  Goal: Achieves optimal ventilation and oxygenation  Description: INTERVENTIONS:  - Assess for changes in respiratory status  - Assess for changes in mentation and behavior  - Position to facilitate oxygenation and minimize respiratory effort  - Oxygen supplementation based on oxygen saturation or ABGs  - Provide Smoking Cessation handout, if applicable  - Encourage broncho-pulmonary hygiene including cough, deep breathe, Incentive Spirometry  - Assess the need for suctioning and perform as needed  - Assess and instruct to report SOB or any respiratory difficulty  - Respiratory Therapy support as indicated  - Manage/alleviate anxiety  - Monitor for signs/symptoms of CO2 retention  Outcome: Progressing     Problem: PAIN - ADULT  Goal: Verbalizes/displays adequate comfort level or patient's stated pain goal  Description: INTERVENTIONS:  - Encourage pt to monitor pain and request assistance  - Assess pain using appropriate pain scale  -  Administer analgesics based on type and severity of pain and evaluate response  - Implement non-pharmacological measures as appropriate and evaluate response  - Consider cultural and social influences on pain and pain management  - Manage/alleviate anxiety  - Utilize distraction and/or relaxation techniques  - Monitor for opioid side effects  - Notify MD/LIP if interventions unsuccessful or patient reports new pain  - Anticipate increased pain with activity and pre-medicate as appropriate  Outcome: Progressing     Problem: SAFETY ADULT - FALL  Goal: Free from fall injury  Description: INTERVENTIONS:  - Assess pt frequently for physical needs  - Identify cognitive and physical deficits and behaviors that affect risk of falls.  - [place] fall precautions as indicated by assessment.  - Educate pt/family on patient safety including physical limitations  - Instruct pt to call for assistance with activity based on assessment  - Modify environment to reduce risk of injury  - Provide assistive devices as appropriate  - Consider OT/PT consult to assist with strengthening/mobility  - Encourage toileting schedule  Outcome: Progressing     Problem: DISCHARGE PLANNING  Goal: Discharge to home or other facility with appropriate resources  Description: INTERVENTIONS:  - Identify barriers to discharge w/pt and caregiver  - Include patient/family/discharge partner in discharge planning  - Arrange for needed discharge resources and transportation as appropriate  - Identify discharge learning needs (meds, wound care, etc)  - Arrange for interpreters to assist at discharge as needed  - Consider post-discharge preferences of patient/family/discharge partner  - Complete POLST form as appropriate  - Assess patient's ability to be responsible for managing their own health  - Refer to Case Management Department for coordinating discharge planning if the patient needs post-hospital services based on physician/LIP order or complex needs  related to functional status, cognitive ability or social support system  Outcome: Progressing

## 2024-01-28 NOTE — PROGRESS NOTES
[facility]  Report of Consultation    Kash Middletonmartin Patient Status:  Inpatient    1945 MRN [id number]   Location [facility] 5NW-A Attending Bacilio Weaver,    Hosp Day # 2 PCP Nannette Maza MD      No complains  Feels better  Family @ bedside         Current Medications:    Current Facility-Administered Medications:     benzonatate (Tessalon) cap 100 mg, 100 mg, Oral, TID PRN    dextromethorphan-guaiFENesin (Robitussin-DM)  mg/5mL oral solution 5 mL, 5 mL, Oral, Q6H PRN    traMADol (Ultram) tab 50 mg, 50 mg, Oral, Q6H PRN    melatonin cap/tab 10 mg, 10 mg, Oral, Nightly PRN    LORazepam (Ativan) tab 1 mg, 1 mg, Oral, BID PRN    lidocaine-menthol 4-1 % patch 1 patch, 1 patch, Transdermal, Daily    iron sucrose (Venofer) 20 mg/mL injection 200 mg, 200 mg, Intravenous, Daily    atorvastatin (Lipitor) tab 40 mg, 40 mg, Oral, Nightly    enalapril (Vasotec) tab 5 mg, 5 mg, Oral, Daily    pantoprazole (Protonix) DR tab 40 mg, 40 mg, Oral, QAM AC    multivitamin (Tab-A-Tammie/Beta Carotene) tab 1 tablet, 1 tablet, Oral, Daily    tiZANidine (Zanaflex) tab 4 mg, 4 mg, Oral, TID PRN    heparin (Porcine) 5000 UNIT/ML injection 5,000 Units, 5,000 Units, Subcutaneous, Q8H YAMILE    acetaminophen (Tylenol Extra Strength) tab 500 mg, 500 mg, Oral, Q4H PRN    ondansetron (Zofran) 4 MG/2ML injection 4 mg, 4 mg, Intravenous, Q6H PRN    metoclopramide (Reglan) 5 mg/mL injection 10 mg, 10 mg, Intravenous, Q8H PRN    polyethylene glycol (PEG 3350) (Miralax) 17 g oral packet 17 g, 17 g, Oral, Daily PRN    sennosides (Senokot) tab 17.2 mg, 17.2 mg, Oral, Nightly PRN    bisacodyl (Dulcolax) 10 MG rectal suppository 10 mg, 10 mg, Rectal, Daily PRN    fleet enema (Fleet) 7-19 GM/118ML rectal enema 133 mL, 1 enema, Rectal, Once PRN    piperacillin-tazobactam (Zosyn) 3.375 g in dextrose 5% 100 mL IVPB-ADDV, 3.375 g, Intravenous, Q8H    sodium chloride 0.9% infusion, , Intravenous, Continuous  Home Medications:  No  current outpatient medications on file.       Review of Systems:  See HPI; A total of 12 systems reviewed and otherwise unremarkable.    Physical Exam:  Vital signs: Blood pressure 154/87, pulse 89, temperature 98.1 °F (36.7 °C), temperature source Oral, resp. rate 18, height 5' 6\" (1.676 m), weight 165 lb 2 oz (74.9 kg), SpO2 96%.  General: No acute distress. Alert and oriented x 3.  HEENT: Moist mucous membranes. EOM-I. PERRL  Neck: No lymphadenopathy.  No JVD. No carotid bruits.  Respiratory: Clear to auscultation bilaterally.  No wheezes. No rhonchi.  Cardiovascular: S1, S2.  Regular rate and rhythm.  No murmurs. Equal pulses   Abdomen: Soft, nontender, nondistended.  Positive bowel sounds. No rebound tenderness   Neurologic: No focal neurological deficits.   Musculoskeletal: Full range of motion of all extremities.  No swelling noted.   Integument: No lesions. No erythema.  Psychiatric: Appropriate mood and affect.    Recent Labs     01/26/24  1536 01/27/24  0839 01/28/24  0628   WBC 11.2* 8.9 7.1   HGB 10.2* 9.7* 9.7*   MCV 78.0* 77.3* 78.1*   .0 270.0 280.0       Recent Labs     01/27/24  1258 01/27/24  1852 01/28/24  0055 01/28/24  0628 01/28/24  1244   * 123* 125* 126*  126* 127*   K 3.6 4.1 4.2 3.8  3.8 3.9   CL 88* 93* 95* 97*  97* 96*   CO2 26.0 22.0 24.0 24.0  24.0 23.0   BUN 13 16 16 12  12 11   CREATSERUM 0.84 0.92 0.87 0.97  0.97 0.98   CA 8.6 8.1* 8.1* 8.6  8.6 8.6       Recent Labs     01/26/24  1536 01/27/24  0839 01/28/24  0628   AST 65* 57* 39*   ALB 2.7* 2.4* 2.4*       Imaging:  Reviewed     Impression:  Hyponatremia- acute on chronic; baseline low 130s; unclear etilogy. Denies excessive fluid intake. Eats fairly well generally, although he has not been eating much for the past 3 days   Urine Na/Osm consistent w/ more of a siadh picture  Na on admit 117; improving @ a good rate ~ 10meq/day  - continue fluids;   - fluid restrict (hypotonic fluids to 1.5 L daily)  - dose w/  tolvaptan  - encourage PO intake   PNA- on abx; supportive care  HTN- stble  meds reviewed; cpm    Thank you for allowing me to participate in this patient's care.  Please feel free to call me with any questions or concerns.    Khari Hoffman MD  1/28/2024

## 2024-01-28 NOTE — PLAN OF CARE
Pt is aox4, VSS. afebrile. On RA. Tele NSR. Up self to the bathroom. I/o's. IV abx, IVF. Tolerating diet well, sodium restriction. 1.5L fluid restriction. Pt is resting in bed with call light in place. No c/o pain SOB or n/v/d. Will continue to monitor.      Problem: RESPIRATORY - ADULT  Goal: Achieves optimal ventilation and oxygenation  Description: INTERVENTIONS:  - Assess for changes in respiratory status  - Assess for changes in mentation and behavior  - Position to facilitate oxygenation and minimize respiratory effort  - Oxygen supplementation based on oxygen saturation or ABGs  - Provide Smoking Cessation handout, if applicable  - Encourage broncho-pulmonary hygiene including cough, deep breathe, Incentive Spirometry  - Assess the need for suctioning and perform as needed  - Assess and instruct to report SOB or any respiratory difficulty  - Respiratory Therapy support as indicated  - Manage/alleviate anxiety  - Monitor for signs/symptoms of CO2 retention  Outcome: Progressing     Problem: PAIN - ADULT  Goal: Verbalizes/displays adequate comfort level or patient's stated pain goal  Description: INTERVENTIONS:  - Encourage pt to monitor pain and request assistance  - Assess pain using appropriate pain scale  - Administer analgesics based on type and severity of pain and evaluate response  - Implement non-pharmacological measures as appropriate and evaluate response  - Consider cultural and social influences on pain and pain management  - Manage/alleviate anxiety  - Utilize distraction and/or relaxation techniques  - Monitor for opioid side effects  - Notify MD/LIP if interventions unsuccessful or patient reports new pain  - Anticipate increased pain with activity and pre-medicate as appropriate  Outcome: Progressing     Problem: SAFETY ADULT - FALL  Goal: Free from fall injury  Description: INTERVENTIONS:  - Assess pt frequently for physical needs  - Identify cognitive and physical deficits and behaviors that  affect risk of falls.  - [place] fall precautions as indicated by assessment.  - Educate pt/family on patient safety including physical limitations  - Instruct pt to call for assistance with activity based on assessment  - Modify environment to reduce risk of injury  - Provide assistive devices as appropriate  - Consider OT/PT consult to assist with strengthening/mobility  - Encourage toileting schedule  Outcome: Progressing     Problem: DISCHARGE PLANNING  Goal: Discharge to home or other facility with appropriate resources  Description: INTERVENTIONS:  - Identify barriers to discharge w/pt and caregiver  - Include patient/family/discharge partner in discharge planning  - Arrange for needed discharge resources and transportation as appropriate  - Identify discharge learning needs (meds, wound care, etc)  - Arrange for interpreters to assist at discharge as needed  - Consider post-discharge preferences of patient/family/discharge partner  - Complete POLST form as appropriate  - Assess patient's ability to be responsible for managing their own health  - Refer to Case Management Department for coordinating discharge planning if the patient needs post-hospital services based on physician/LIP order or complex needs related to functional status, cognitive ability or social support system  Outcome: Progressing

## 2024-01-28 NOTE — PROGRESS NOTES
[facility]     Hospitalist Progress Note     Kash Middletonmartin Patient Status:  Inpatient    1945 MRN [id number]   Location [facility] 5NW-A Attending Bacilio Weaver,    Hosp Day # 2 PCP Nannette Maza MD     Chief Complaint: SOB, cough, generalized weakness    Subjective:     Patient still reports breathing is not yet back to baseline. States he was up all night urinating.     Objective:    Review of Systems:   A comprehensive review of systems was completed; pertinent positive and negatives stated in subjective.    Vital signs:  Temp:  [97.6 °F (36.4 °C)-98.4 °F (36.9 °C)] 97.8 °F (36.6 °C)  Pulse:  [74-89] 89  Resp:  [16-22] 18  BP: (142-166)/(72-87) 154/87  SpO2:  [95 %-96 %] 96 %    Physical Exam:    General: No acute distress, awake and alert  Respiratory: No wheezes, rales on right side  Cardiovascular: S1, S2, regular rate and rhythm  Abdomen: Soft, Non-tender, non-distended, positive bowel sounds  Extremities: No edema    Diagnostic Data:    Labs:  Recent Labs   Lab 24  1536 24  0839 24  0628   WBC 11.2* 8.9 7.1   HGB 10.2* 9.7* 9.7*   MCV 78.0* 77.3* 78.1*   .0 270.0 280.0     Recent Labs   Lab 24  1536 24  2023 24  0839 24  1258 24  1852 24  0055 24  0628   GLU 80   < > 90  90   < > 145* 120* 102*  102*   BUN 20   < > 15  15   < > 16 16 12  12   CREATSERUM 0.94   < > 0.81  0.81   < > 0.92 0.87 0.97  0.97   CA 9.1   < > 8.7  8.7   < > 8.1* 8.1* 8.6  8.6   ALB 2.7*  --  2.4*  --   --   --  2.4*   *   < > 120*  120*   < > 123* 125* 126*  126*   K 4.4   < > 3.7  3.7   < > 4.1 4.2 3.8  3.8   CL 85*   < > 87*  87*   < > 93* 95* 97*  97*   CO2 25.0   < > 25.0  25.0   < > 22.0 24.0 24.0  24.0   ALKPHO 123*  --  109  --   --   --  114   AST 65*  --  57*  --   --   --  39*   BILT 0.8  --  0.8  --   --   --  0.5   TP 7.2  --  6.4  --   --   --  6.4    < > = values in this interval not displayed.      Estimated Creatinine Clearance: 57.6 mL/min (based on SCr of 0.97 mg/dL).    Recent Labs   Lab 01/26/24  1536   TROPHS 19     No results for input(s): \"PTP\", \"INR\" in the last 168 hours.     Microbiology  Hospital Encounter on 01/26/24   1. Blood Culture     Status: None (Preliminary result)    Collection Time: 01/26/24  4:49 PM    Specimen: Blood,peripheral   Result Value Ref Range    Blood Culture Result No Growth 1 Day N/A     Imaging: Reviewed in Epic.    Medications:    lidocaine-menthol  1 patch Transdermal Daily    iron sucrose  200 mg Intravenous Daily    atorvastatin  40 mg Oral Nightly    enalapril  5 mg Oral Daily    pantoprazole  40 mg Oral QAM AC    multivitamin  1 tablet Oral Daily    heparin  5,000 Units Subcutaneous Q8H YAMILE    piperacillin-tazobactam  3.375 g Intravenous Q8H       Assessment & Plan:      #Generalized weakness  -PT/OT recommending C     #Pneumonia  -Follow blood cultures, so far NGTD  -MRSA nares, urine strep/legionella negative  -Sputum cultures if able  -RVP negative  -PCT only 0.08 but continue empiric abx for now. Still feels SOB so given lasix 20 mg x 2 as findings could be asymmetric pulm edema    #Acute on chronic hyponatremia  -Na 117 on arrival, was 129 in April 2023  -Nephrology consult  -1.5L Fluid restriction   -Nephrology consulted  -Monitor BMP    #Elevated proBNP  -Echo last month showed pEF and no major valvular abnormalities except moderate TR  -Lasix x 2      #Ess HTN  -Resume home ACEi     #Dyslipidemia  -Statin     #GERD  -PPI     #Mild ELevated LFTs  -Trending down  -Repeat CMP in AM    #Microcytic anemia with iron deficiency  -IV iron  -Monitor hgb    #Hx PAF s/p cardioversion  -No longer on AC or rate control meds    #Hx of mitral regurgitation s/p bioprosthesis    Bacilio Weaver DO    Supplementary Documentation:     Quality:  DVT Mechanical Prophylaxis:   SCDs,    DVT Pharmacologic Prophylaxis   Medication    heparin (Porcine) 5000 UNIT/ML injection 5,000  Units   Code Status: Full  Macario: No urinary catheter in place  ANNE: Few days    Discharge is dependent on: Clinical state, consultant recs  At this point Mr. Gondalia is expected to be discharge to: Home    The 21st Century Cures Act makes medical notes like these available to patients in the interest of transparency. Please be advised this is a medical document. Medical documents are intended to carry relevant information, facts as evident, and the clinical opinion of the practitioner. The medical note is intended as peer to peer communication and may appear blunt or direct. It is written in medical language and may contain abbreviations or verbiage that are unfamiliar.

## 2024-01-28 NOTE — CM/SW NOTE
01/28/24 1600   CM/SW Referral Data   Referral Source Social Work (self-referral)   Reason for Referral Discharge planning   Informant EMR;Clinical Staff Member   Discharge Needs   Anticipated D/C needs Home health care;To be determined     HOME SITUATION  Type of Home: House   Home Layout: Two level;Able to live on main level  Stairs to Enter : 2  Stairs to Bedroom: 14  Railing: Yes     Lives With: Alone  Drives: Yes  Patient Owned Equipment: Rolling walker     Prior Level of [place] per PT eval: per pt reports,pt was able to amb without assistive device without assist prior to 2 weeks ago. Pt has had increase dizziness and SOB, weakness. Pt has 14 steps to  2nd floor bedroom with handrail. Per pt daughter, they can move pt bed and belongings so pt can live on main level. Pt daughter also able to stay with pt overnight initially to provide assist as needed once pt is discharged from edw.  Pt denies fall hx and drives short distances prior to 3-4 weeks ago. Pt daughter lives in [place] and is an OT.      Noted PT recommendation for home health services.  referral sent in AIDIN, choice list will be provided once available. SW will continue to follow.    ANTOINE Batista  Discharge Planner

## 2024-01-29 LAB
ANION GAP SERPL CALC-SCNC: 7 MMOL/L (ref 0–18)
BUN BLD-MCNC: 8 MG/DL (ref 9–23)
CALCIUM BLD-MCNC: 9.1 MG/DL (ref 8.5–10.1)
CHLORIDE SERPL-SCNC: 104 MMOL/L (ref 98–112)
CO2 SERPL-SCNC: 22 MMOL/L (ref 21–32)
CORTIS SERPL-MCNC: 32.4 UG/DL
CREAT BLD-MCNC: 0.74 MG/DL
EGFRCR SERPLBLD CKD-EPI 2021: 93 ML/MIN/1.73M2 (ref 60–?)
GLUCOSE BLD-MCNC: 97 MG/DL (ref 70–99)
OSMOLALITY SERPL CALC.SUM OF ELEC: 274 MOSM/KG (ref 275–295)
POTASSIUM SERPL-SCNC: 4 MMOL/L (ref 3.5–5.1)
SODIUM SERPL-SCNC: 133 MMOL/L (ref 136–145)

## 2024-01-29 PROCEDURE — 99232 SBSQ HOSP IP/OBS MODERATE 35: CPT | Performed by: INTERNAL MEDICINE

## 2024-01-29 PROCEDURE — 99233 SBSQ HOSP IP/OBS HIGH 50: CPT | Performed by: INTERNAL MEDICINE

## 2024-01-29 RX ORDER — AMOXICILLIN AND CLAVULANATE POTASSIUM 875; 125 MG/1; MG/1
875 TABLET, FILM COATED ORAL EVERY 12 HOURS SCHEDULED
Status: DISCONTINUED | OUTPATIENT
Start: 2024-01-29 | End: 2024-01-30

## 2024-01-29 RX ORDER — ENALAPRIL MALEATE 10 MG/1
10 TABLET ORAL DAILY
Status: DISCONTINUED | OUTPATIENT
Start: 2024-01-30 | End: 2024-01-30

## 2024-01-29 RX ORDER — HYDRALAZINE HYDROCHLORIDE 20 MG/ML
10 INJECTION INTRAMUSCULAR; INTRAVENOUS EVERY 6 HOURS PRN
Status: DISCONTINUED | OUTPATIENT
Start: 2024-01-29 | End: 2024-01-30

## 2024-01-29 NOTE — PAYOR COMM NOTE
--------------  ADMISSION REVIEW     Payor: [facility]  Subscriber #:  [id number]  Authorization Number: [id number]    Admit date: 1/26/24  Admit time:  8:34 PM       Patient Seen in: [facility] Emergency Department    History     Stated Complaint: sob, lighheaded, generalized weakness    78-year-old male presents today for evaluation.  For the past 2 weeks, patient thought he had a cold.  He has had a persistent dry cough.  Over the last several days, patient has been feeling more weak.  Daughter contributes that he has been having a hard time getting up and moving around.  Patient states he feels short of breath, usually worse with exertion.  He does not have any fevers, chest pain or hemoptysis.  He does not have any leg swelling either.  Because of his weakness, he is brought in for evaluation.    Objective:   Past Medical History:   Diagnosis Date    Anxiety state     Arrhythmia     Arthritis     Back pain     Back problem     Bloating     Depression     Esophageal reflux     Flatulence/gas pain/belching     Frequent urination     Frequent use of laxatives     Heartburn     High cholesterol     History of blood transfusion     History of cardiac murmur     Indigestion     Muscle weakness     Osteoarthritis     Other and unspecified hyperlipidemia     Pain in joints     Presence of other cardiac implants and grafts     Renal disorder     Shortness of breath     Unspecified essential hypertension     Visual impairment     Wears glasses      History reviewed. No pertinent surgical history.    Physical Exam     ED Triage Vitals [01/26/24 1510]   BP (!) 170/83   Pulse 78   Resp 20   Temp 98.1 °F (36.7 °C)   Temp src Oral   SpO2 100 %   O2 Device None (Room air)     Current:/76 (BP Location: Right arm)   Pulse 68   Temp 97.9 °F (36.6 °C) (Oral)   Resp 18   Ht 167.6 cm (5' 6\")   Wt 74.9 kg   SpO2 100%   BMI 26.65 kg/m²       Physical Exam  Vitals and nursing note reviewed.    Constitutional:       Appearance: Normal appearance.   HENT:      Head: Normocephalic.      Nose: Nose normal.      Mouth/Throat:      Mouth: Mucous membranes are moist.   Eyes:      Extraocular Movements: Extraocular movements intact.   Cardiovascular:      Rate and Rhythm: Normal rate.   Pulmonary:      Effort: Pulmonary effort is normal.      Comments: Right basilar crackles, lungs are otherwise clear.  Abdominal:      General: Abdomen is flat.   Musculoskeletal:         General: Normal range of motion.      Right lower leg: No edema.      Left lower leg: No edema.   Skin:     General: Skin is warm.   Neurological:      General: No focal deficit present.      Mental Status: He is alert.   Psychiatric:         Mood and Affect: Mood normal.     Labs Reviewed   COMP METABOLIC PANEL (14) - Abnormal; Notable for the following components:       Result Value    Sodium 117 (*)     Chloride 85 (*)     Calculated Osmolality 246 (*)     AST 65 (*)     Alkaline Phosphatase 123 (*)     Albumin 2.7 (*)     Globulin  4.5 (*)     A/G Ratio 0.6 (*)     All other components within normal limits   PRO BETA NATRIURETIC PEPTIDE - Abnormal; Notable for the following components:    Pro-Beta Natriuretic Peptide 4,037 (*)     All other components within normal limits   BASIC METABOLIC PANEL (8) - Abnormal; Notable for the following components:    Sodium 119 (*)     Chloride 87 (*)     Calculated Osmolality 249 (*)     All other components within normal limits   CBC W/ DIFFERENTIAL - Abnormal; Notable for the following components:    WBC 11.2 (*)     RBC 3.77 (*)     HGB 10.2 (*)     HCT 29.4 (*)     MCV 78.0 (*)     Neutrophil Absolute Prelim 9.58 (*)     Neutrophil Absolute 9.58 (*)     Lymphocyte Absolute 0.75 (*)     All other components within normal limits   LACTIC ACID, PLASMA - Normal   TROPONIN I HIGH SENSITIVITY - Normal   PROCALCITONIN - Normal   LEGIONELLA URINE AG SEROGRP 1 - Normal   STREPTOCOCCUS PNEUMONIAE AG, URINE -  Normal   OSMOLALITY, URINE - Normal   SARS-COV-2/FLU A AND B/RSV BY PCR (GENEXPERT) - Normal   BLOOD CULTURE   BLOOD CULTURE   SPUTUM CULTURE     EKG 73. Atrial Fibrillation  Reading: No STEMI     XR CHEST AP PORTABLE    There is marked cardiomegaly with mild pulmonary vascular congestion.  There are increased interstitial markings in the lungs, right greater than left.  There is patchy airspace consolidation in the mid to lower right lung.  The overall findings are most concerning for pneumonia in the right lung, with changes of asymmetric pulmonary edema from congestive failure not entirely excluded.  Clinical correlation recommended.  Minimal right pleural effusion.     MDM      Differential Diagnosis  78-year-old male presents today for evaluation of several weeks of a cough that is now progressed to generalized weakness and shortness of breath with exertion.  He is satting well on room air and has no increased work of breathing.  On auscultation, he does have right basilar crackles.  Differential would include bronchitis, pneumonia, CHF, acute coronary syndrome.  Plan for chest x-ray along with labs.  Will give fluids and reassess.    4:39 pm  Patient noted to have critical hyponatremia.  I reviewed case with Dr. Hoffman of nephrology.  Patient had only received about 50 mL of normal saline.  Recommendation was to place him on a rate of 75 ml/hr and fluid restrict, urine sodium and osm ordered at request.  Chest x-ray demonstrates a right-sided infiltrate, and concern for pneumonia.  IV antibiotics ordered.    6:09 pm  Patient remains hemodynamically stable .  Will admit to the hospital for continued care.  I spoke with the hospitalist in regards to admission.    Disposition and Plan     Clinical Impression:  1. Community acquired pneumonia of right lung, unspecified part of lung    2. Hyponatremia       Hospital Problems       Present on Admission  Date Reviewed: 6/15/2023            ICD-10-CM Noted POA    *  (Principal) Community acquired pneumonia of right lung, unspecified part of lung J18.9 1/26/2024 Unknown    Benign essential HTN I10 6/3/2019 Yes    Dyslipidemia E78.5 6/15/2023 Yes    Gastroesophageal reflux disease K21.9 6/3/2019 Yes    Hyponatremia E87.1 1/26/2024 Unknown             [facility]  History and Physical     Sureshchan P Gondalia is a 78 year old male with PMhx of DL, HTN, presents with SOB. Pt has had symptoms for the past few weeks. He has some assocaited dizziness. He denies any CP. He has cough as well. No F/C. No N/V/D/C or abd pain. He has not been eating much over the past few days. He states he had some rice and soup yesterday and some ensure.  No recent travel or sick contacts he is aware of. He denies any focal weakness, numbness or tingling. No vision changes or headaches. He is very weak at home and has not been ambulating much. He denies any leg edema.     Objective:   Physical Exam:    BP (!) 125/105   Pulse 72   Temp 98.1 °F (36.7 °C) (Oral)   Resp 17   Ht 167.6 cm (5' 6\")   Wt 162 lb (73.5 kg)   SpO2 95%   BMI 26.15 kg/m²   General: No acute distress, Alert  Respiratory: R sided crackles  Cardiovascular: S1, S2. Regular rate and rhythm  Abdomen: Soft, Non-tender, non-distended, positive bowel sounds  Neuro: No new focal deficits  Extremities: No edema      Lab 01/26/24  1536   RBC 3.77*   HGB 10.2*   HCT 29.4*   MCV 78.0*   MCH 27.1   MCHC 34.7   RDW 14.3   NEPRELIM 9.58*   WBC 11.2*   .0     GLU 80   BUN 20   CREATSERUM 0.94   EGFRCR 83   CA 9.1   ALB 2.7*   *   K 4.4   CL 85*   CO2 25.0   ALKPHO 123*   AST 65*   BILT 0.8   TP 7.2     PBNP 4,037*     Assessment & Plan:      #Gen Weakness  PT/OT  Likely due to active infection    #Pneumonia  Continue empiric abx  Follow cultures  O2 as needed  Check urine strep,legionella    #Hyponatremia  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 No much better. Reports cough past 3 weeks. Has left sided chest pain with coughing.      Vital signs:  Temp:  [97.7 °F (36.5 °C)-98.2 °F (36.8 °C)] 97.7 °F (36.5 °C)  Pulse:  [62-80] 68  Resp:  [17-22] 18  BP: (125-177)/() 160/88  SpO2:  [90 %-100 %] 97 %     Physical Exam:    General: No acute distress, awake and alert  Respiratory: No wheezes, rales on right side  Cardiovascular: S1, S2, regular rate and rhythm  Abdomen: Soft, Non-tender, non-distended, positive bowel sounds  Extremities: No edema     Lab 01/26/24  1536 01/27/24  0839   WBC 11.2* 8.9   HGB 10.2* 9.7*   MCV 78.0* 77.3*   .0 270.0      Lab 01/26/24  1536 01/26/24  2023 01/27/24  0009 01/27/24  0839   GLU 80 79 119* 90  90   BUN 20 18 19 15  15   CREATSERUM 0.94 0.92 0.89 0.81  0.81   CA 9.1 8.5 8.0* 8.7  8.7   ALB 2.7*  --   --  2.4*   * 119* 121* 120*  120*   K 4.4 4.1 3.9 3.7  3.7   CL 85* 87* 87* 87*  87*   CO2 25.0 25.0 24.0 25.0  25.0   ALKPHO 123*  --   --  109   AST 65*  --   --  57*   BILT 0.8  --   --  0.8   TP 7.2  --   --  6.4       Medications:    atorvastatin  40 mg Oral Nightly    enalapril  5 mg Oral Daily    pantoprazole  40 mg Oral QAM AC    multivitamin  1 tablet Oral Daily    heparin  5,000 Units Subcutaneous Q8H YAMILE    piperacillin-tazobactam  3.375 g Intravenous Q8H    azithromycin  500 mg Intravenous Q24H      Assessment & Plan:   #Generalized weakness  -PT/OT     #Pneumonia  -Follow blood cultures  -MRSA nares, urine strep/legionella negative  -PCT only 0.08 but continue empiric abx for now. If no improvement in breathing, can consider diuresis trial as findings could be asymmetric pulm edema  -Sputum cultures if able  -Check RVP     #Acute on chronic hyponatremia  -Na 117 on arrival, was 129 in April 2023  -Nephrology consult  -1.5L Fluid restriction    -Renal to eval     #Elevated proBNP  -Echo last month showed pEF and no major valvular abnormalities except moderate TR     #Ess HTN  -Resume home ACEi     #Dyslipidemia  -Statin     #GERD  -PPI     #Mild ELevated LFTs  -Trending down, hgb was 12.1 in April 2023  -Repeat CMP in AM     #Microcytic anemia  -Check iron studies  -Monitor hgb     #Hx PAF s/p cardioversion  -No longer on AC or rate control meds     #Hx of mitral regurgitation s/p bioprosthesis      RENAL:    Sureshchan P Gondalia is a a(n) 78 year old male with  hx of HTN, HL who presents to the ER w/ complain of weakness. Pt able to provide hx. Daughter @ bedside  Reports he has been feeling a bit off for few days- poor PO intake for last 3 days  Denies any n/v  No diarrhea  No cp  No f/c  Baseline Na is a bit lower ~ low 130s  Denies any diuretic use  No anti seizure med/no SSRIs; takes prn xanax    Impression:  Hyponatremia- acute on chronic; baseline low 130s; unclear etilogy. Denies excessive fluid intake. Eats fairly well generally, although he has not been eating much for the past 3 days   Urine Na/Osm consistent w/ more of a siadh picture  Na on admit 117; improving gradually; 123 now  - monitor rate of correction  - continue fluids; fluid restrict (hypotonic fluids to 1.5 L daily)  - will give small dose of loop diuretic along w/ fluids  - encourage PO intake- he ate his lunch pretty well  - no urgent need for tolvaptan/3% saline; appears to be mentating well   PNA- on abx; supportive care  HTN- stble  meds reviewed; cpm      1/28:    HOSPITALIST:    Patient still reports breathing is not yet back to baseline. States he was up all night urinating.       Vital signs:  Temp:  [97.6 °F (36.4 °C)-98.4 °F (36.9 °C)] 97.8 °F (36.6 °C)  Pulse:  [74-89] 89  Resp:  [16-22] 18  BP: (142-166)/(72-87) 154/87  SpO2:  [95 %-96 %] 96 %     Physical Exam:    General: No acute distress, awake and alert  Respiratory: No wheezes, rales on right  side  Cardiovascular: S1, S2, regular rate and rhythm  Abdomen: Soft, Non-tender, non-distended, positive bowel sounds  Extremities: No edema     Lab 01/26/24  1536 01/27/24  0839 01/28/24  0628   WBC 11.2* 8.9 7.1   HGB 10.2* 9.7* 9.7*   MCV 78.0* 77.3* 78.1*   .0 270.0 280.0      Lab 01/26/24  1536 01/26/24 2023 01/27/24  0839 01/27/24  1258 01/27/24  1852 01/28/24  0055 01/28/24  0628   GLU 80   < > 90  90   < > 145* 120* 102*  102*   BUN 20   < > 15  15   < > 16 16 12  12   CREATSERUM 0.94   < > 0.81  0.81   < > 0.92 0.87 0.97  0.97   CA 9.1   < > 8.7  8.7   < > 8.1* 8.1* 8.6  8.6   ALB 2.7*  --  2.4*  --   --   --  2.4*   *   < > 120*  120*   < > 123* 125* 126*  126*   K 4.4   < > 3.7  3.7   < > 4.1 4.2 3.8  3.8   CL 85*   < > 87*  87*   < > 93* 95* 97*  97*   CO2 25.0   < > 25.0  25.0   < > 22.0 24.0 24.0  24.0   ALKPHO 123*  --  109  --   --   --  114   AST 65*  --  57*  --   --   --  39*   BILT 0.8  --  0.8  --   --   --  0.5   TP 7.2  --  6.4  --   --   --  6.4   Medications:    lidocaine-menthol  1 patch Transdermal Daily    iron sucrose  200 mg Intravenous Daily    atorvastatin  40 mg Oral Nightly    enalapril  5 mg Oral Daily    pantoprazole  40 mg Oral QAM AC    multivitamin  1 tablet Oral Daily    heparin  5,000 Units Subcutaneous Q8H YAMILE    piperacillin-tazobactam  3.375 g Intravenous Q8H       Assessment & Plan:   #Generalized weakness  -PT/OT recommending HHC     #Pneumonia  -Follow blood cultures, so far NGTD  -MRSA nares, urine strep/legionella negative  -Sputum cultures if able  -RVP negative  -PCT only 0.08 but continue empiric abx for now. Still feels SOB so given lasix 20 mg x 2 as findings could be asymmetric pulm edema     #Acute on chronic hyponatremia  -Na 117 on arrival, was 129 in April 2023  -Nephrology consult  -1.5L Fluid restriction   -Nephrology consulted  -Monitor BMP     #Elevated proBNP  -Echo last month showed pEF and no major valvular abnormalities  except moderate TR  -Lasix x 2      #Ess HTN  -Resume home ACEi     #Dyslipidemia  -Statin     #GERD  -PPI     #Mild ELevated LFTs  -Trending down  -Repeat CMP in AM     #Microcytic anemia with iron deficiency  -IV iron  -Monitor hgb     #Hx PAF s/p cardioversion  -No longer on AC or rate control meds     #Hx of mitral regurgitation s/p bioprosthesis      RENAL:     Impression:  Hyponatremia- acute on chronic; baseline low 130s; unclear etilogy. Denies excessive fluid intake. Eats fairly well generally, although he has not been eating much for the past 3 days   Urine Na/Osm consistent w/ more of a siadh picture  Na on admit 117; improving @ a good rate ~ 10meq/day  - continue fluids;   - fluid restrict (hypotonic fluids to 1.5 L daily)  - dose w/ tolvaptan  - encourage PO intake   PNA- on abx; supportive care  HTN- stble  meds reviewed; cpm    sodium chloride 0.9 % IV bolus 250 mL  Dose: 250 mL  Freq: Once Route: IV  Last Dose: 250 mL (01/27/24 2345)  Start: 01/27/24 2245 End: 01/28/24 0045    2345 JH-New Bag          Continuous Meds Sorted by Name  for Gondalia, Sureshchan P as of 1/23/24 through 1/29/24    1 Day 3 Days 7 Days 10 Days < Today >   Legend:       Medications 01/26/24 01/27/24 01/28/24 01/29/24   sodium chloride 0.9% infusion  Rate: 100 mL/hr Freq: Continuous Route: IV  Start: 01/26/24 2130 End: 01/29/24 0857     2148 MN-New Bag      1407 PT-New Bag     1452 PT-Rate/Dose Change      0044 JH-Rate/Dose Change     0619 JH-New Bag     1614 HD-New Bag      0009 LD-New Bag     0857-D/C'd        MEDICATIONS ADMINISTERED IN LAST 1 DAY:  atorvastatin (Lipitor) tab 40 mg       Date Action Dose Route User    1/28/2024 2024 Given 40 mg Oral Mayela Atkinson RN          benzonatate (Tessalon) cap 100 mg       Date Action Dose Route User    1/29/2024 0548 Given 100 mg Oral Mayela Atkinson RN    1/28/2024 2024 Given 100 mg Oral Desdonnierdine, Mayela, RN          enalapril (Vasotec) tab 5 mg       Date Action Dose  Route User    1/29/2024 0622 Given 5 mg Oral Mayela Atkinson RN          heparin (Porcine) 5000 UNIT/ML injection 5,000 Units       Date Action Dose Route User    1/29/2024 0548 Given 5,000 Units Subcutaneous (Left Lower Abdomen) Mayela Atkinson RN    1/28/2024 2024 Given 5,000 Units Subcutaneous (Left Lower Abdomen) Mayela Atkinson RN    1/28/2024 1347 Given 5,000 Units Subcutaneous (Right Lower Abdomen) Renay Bustillos RN          iron sucrose (Venofer) 20 mg/mL injection 200 mg       Date Action Dose Route User    1/29/2024 0840 New Bag 200 mg Intravenous Nahomi Hobson RN          lidocaine-menthol 4-1 % patch 1 patch       Date Action Dose Route User    1/29/2024 0840 Patch Applied 1 patch Transdermal (Left Posterior Chest) Nahomi Hobson RN          pantoprazole (Protonix) DR tab 40 mg       Date Action Dose Route User    1/29/2024 0549 Given 40 mg Oral Mayela Atkinson RN          piperacillin-tazobactam (Zosyn) 3.375 g in dextrose 5% 100 mL IVPB-ADDV       Date Action Dose Route User    1/29/2024 0840 New Bag 3.375 g Intravenous Nahomi Hobson RN    1/29/2024 0006 New Bag 3.375 g Intravenous Mayela Atkinson RN    1/28/2024 1740 New Bag 3.375 g Intravenous Renay Bustillos RN          sodium chloride 0.9% infusion       Date Action Dose Route User    1/29/2024 0009 New Bag (none) Intravenous Mayela Atkinson RN    1/28/2024 1614 New Bag (none) Intravenous Renay Bustillos RN          multivitamin (Tab-A-Tammie/Beta Carotene) tab 1 tablet       Date Action Dose Route User    1/29/2024 0841 Given 1 tablet Oral Nahomi Hobson RN          tolvaptan (Samsca) tab 15 mg       Date Action Dose Route User    1/28/2024 1615 Given 15 mg Oral Renay Bustillos RN          traMADol (Ultram) tab 50 mg       Date Action Dose Route User    1/29/2024 0549 Given 50 mg Oral Mayela Atkinson RN    1/28/2024 2024 Given 50 mg Oral Desjardine, MARY Pedro            Vitals (last day)       Date/Time Temp Pulse  Resp BP SpO2 Weight O2 Device O2 Flow Rate (L/min) [facility]    01/29/24 1130 98 °F (36.7 °C) 77 19 150/81 100 % -- None (Room air) --     01/29/24 1027 -- 84 -- 159/78 -- -- -- --     01/29/24 1015 -- 75 -- 183/113 -- -- -- -- CM    01/29/24 0745 -- 72 -- 164/86 99 % -- -- --     01/29/24 0730 97.8 °F (36.6 °C) 75 18 173/95 100 % -- None (Room air) --     01/29/24 0615 98 °F (36.7 °C) 74 18 174/78 93 % -- None (Room air) --     01/29/24 0030 -- -- -- 161/68 -- -- -- --     01/29/24 0008 -- 85 -- 165/81 -- -- -- --     01/28/24 2338 98.2 °F (36.8 °C) 77 18 -- 99 % -- None (Room air) --     01/28/24 2017 98 °F (36.7 °C) 72 20 136/78 100 % -- None (Room air) --     01/28/24 1759 97.4 °F (36.3 °C) -- 18 -- -- -- -- --     01/28/24 1251 98.1 °F (36.7 °C) -- 18 -- -- -- -- --     01/28/24 0803 97.8 °F (36.6 °C) -- 18 -- -- -- -- --     01/28/24 0613 97.8 °F (36.6 °C) 89 16 154/87 96 % -- -- -- FK

## 2024-01-29 NOTE — PROGRESS NOTES
[facility]     Hospitalist Progress Note     Kash JOSE Kanemartin Patient Status:  Inpatient    1945 MRN [id number]   Location [facility] 5NW-A Attending Bacilio Weaver,    Hosp Day # 3 PCP Nannette Maza MD     Chief Complaint: SOB, cough, generalized weakness    Subjective:     Patient overall feeling better. Thinks he will be ready to go home tomorrow.     Objective:    Review of Systems:   A comprehensive review of systems was completed; pertinent positive and negatives stated in subjective.    Vital signs:  Temp:  [97.4 °F (36.3 °C)-98.2 °F (36.8 °C)] 98 °F (36.7 °C)  Pulse:  [72-85] 77  Resp:  [18-20] 19  BP: (136-183)/() 150/81  SpO2:  [93 %-100 %] 100 %    Physical Exam:    General: No acute distress, awake and alert  Respiratory: No wheezes, rales on right side  Cardiovascular: S1, S2, regular rate and rhythm  Abdomen: Soft, Non-tender, non-distended, positive bowel sounds  Extremities: No edema    Diagnostic Data:    Labs:  Recent Labs   Lab 24  1536 24  0839 24  0628   WBC 11.2* 8.9 7.1   HGB 10.2* 9.7* 9.7*   MCV 78.0* 77.3* 78.1*   .0 270.0 280.0     Recent Labs   Lab 24  1536 24  2023 24  0839 24  1258 24  0628 24  1244 24  0637   GLU 80   < > 90  90   < > 102*  102* 100* 97   BUN 20   < > 15  15   < > 12  12 11 8*   CREATSERUM 0.94   < > 0.81  0.81   < > 0.97  0.97 0.98 0.74   CA 9.1   < > 8.7  8.7   < > 8.6  8.6 8.6 9.1   ALB 2.7*  --  2.4*  --  2.4*  --   --    *   < > 120*  120*   < > 126*  126* 127* 133*   K 4.4   < > 3.7  3.7   < > 3.8  3.8 3.9 4.0   CL 85*   < > 87*  87*   < > 97*  97* 96* 104   CO2 25.0   < > 25.0  25.0   < > 24.0  24.0 23.0 22.0   ALKPHO 123*  --  109  --  114  --   --    AST 65*  --  57*  --  39*  --   --    BILT 0.8  --  0.8  --  0.5  --   --    TP 7.2  --  6.4  --  6.4  --   --     < > = values in this interval not displayed.     Estimated Creatinine Clearance:  75.4 mL/min (based on SCr of 0.74 mg/dL).    Recent Labs   Lab 01/26/24  1536   TROPHS 19     No results for input(s): \"PTP\", \"INR\" in the last 168 hours.     Microbiology  Hospital Encounter on 01/26/24   1. Blood Culture     Status: None (Preliminary result)    Collection Time: 01/26/24  4:49 PM    Specimen: Blood,peripheral   Result Value Ref Range    Blood Culture Result No Growth 2 Days N/A     Imaging: Reviewed in Epic.    Medications:    amoxicillin potassium and clavulanate  875 mg Oral Q12H    lidocaine-menthol  1 patch Transdermal Daily    atorvastatin  40 mg Oral Nightly    enalapril  5 mg Oral Daily    pantoprazole  40 mg Oral QAM AC    multivitamin  1 tablet Oral Daily    heparin  5,000 Units Subcutaneous Q8H YAMILE       Assessment & Plan:      #Generalized weakness  -PT/OT recommending HHC     #Right-sided pneumonia  -Follow blood cultures, so far NGTD  -MRSA nares, urine strep/legionella negative  -RVP negative  -PCT only 0.08 but continue empiric abx for now. Given lasix 20 mg x 2 as findings could be asymmetric pulm edema    #Acute on chronic hyponatremia. Improving  -Na 117 on arrival, was 129 in April 2023  -Nephrology consult  -1.5L Fluid restriction   -Nephrology consulted  -Monitor BMP    #Elevated proBNP  -Echo last month showed pEF and no major valvular abnormalities except moderate TR  -Lasix x 2      #Ess HTN  -Increase home ACEi  -PRN hydralazine    #Dyslipidemia  -Statin     #GERD  -PPI     #Mild ELevated LFTs  -Trended down    #Microcytic anemia with iron deficiency  -IV iron given  -Monitor hgb    #Hx PAF s/p cardioversion  -No longer on AC or rate control meds    #Memory loss  -Previously was on aricept, stopped as he was not tolerating it    #Hx of mitral regurgitation s/p bioprosthesis    Bacilio Weaver DO    Supplementary Documentation:     Quality:  DVT Mechanical Prophylaxis:   SCDs,    DVT Pharmacologic Prophylaxis   Medication    heparin (Porcine) 5000 UNIT/ML injection 5,000 Units    Code Status: Full  Macario: No urinary catheter in place  ANNE: 1 day    Discharge is dependent on: Clinical state, consultant recs  At this point Mr. Gondalia is expected to be discharge to: Home    The 21st Century Cures Act makes medical notes like these available to patients in the interest of transparency. Please be advised this is a medical document. Medical documents are intended to carry relevant information, facts as evident, and the clinical opinion of the practitioner. The medical note is intended as peer to peer communication and may appear blunt or direct. It is written in medical language and may contain abbreviations or verbiage that are unfamiliar.

## 2024-01-29 NOTE — HOME CARE LIAISON
[facility]. Spoke w/ patient who is agreeable with [facility]. Contact information placed on AVS.

## 2024-01-29 NOTE — PLAN OF CARE
Pt A&OX4. Glasses at bedside. Seizure precautions in place for low sodium. Room air, . C/o of sob at times with activity. C/o fo cough, prn antitussives given. Tele, NSR. Afebrile. Heparin. IV lasix. PO/IV abx. IVF. Voids, up ad jairo. No c/o of n/v. Episodes of diarrhea during the day, (-)cdiff. C/o of L rib pain d/t coughing, prn tramadol given. Cardiac diet, 1500ml fluid restriction. Pt updated on poc. No further needs at this time. Safety precautions in place. Call light within reach.       Problem: Patient/Family Goals  Goal: Patient/Family Long Term Goal  Description: Patient's Long Term Goal: Home     Interventions:  - Abx, IVF, labs   - See additional Care Plan goals for specific interventions  Outcome: Progressing  Goal: Patient/Family Short Term Goal  Description: Patient's Short Term Goal: electrolyte balance   1/27 noc: improve sodium   1/28 NOC: sleep well, improve pain     Interventions:   - FR, coverage, nephrology on consult   - See additional Care Plan goals for specific interventions  Outcome: Progressing     Problem: RESPIRATORY - ADULT  Goal: Achieves optimal ventilation and oxygenation  Description: INTERVENTIONS:  - Assess for changes in respiratory status  - Assess for changes in mentation and behavior  - Position to facilitate oxygenation and minimize respiratory effort  - Oxygen supplementation based on oxygen saturation or ABGs  - Provide Smoking Cessation handout, if applicable  - Encourage broncho-pulmonary hygiene including cough, deep breathe, Incentive Spirometry  - Assess the need for suctioning and perform as needed  - Assess and instruct to report SOB or any respiratory difficulty  - Respiratory Therapy support as indicated  - Manage/alleviate anxiety  - Monitor for signs/symptoms of CO2 retention  Outcome: Progressing     Problem: PAIN - ADULT  Goal: Verbalizes/displays adequate comfort level or patient's stated pain goal  Description: INTERVENTIONS:  - Encourage pt to monitor  pain and request assistance  - Assess pain using appropriate pain scale  - Administer analgesics based on type and severity of pain and evaluate response  - Implement non-pharmacological measures as appropriate and evaluate response  - Consider cultural and social influences on pain and pain management  - Manage/alleviate anxiety  - Utilize distraction and/or relaxation techniques  - Monitor for opioid side effects  - Notify MD/LIP if interventions unsuccessful or patient reports new pain  - Anticipate increased pain with activity and pre-medicate as appropriate  Outcome: Progressing     Problem: SAFETY ADULT - FALL  Goal: Free from fall injury  Description: INTERVENTIONS:  - Assess pt frequently for physical needs  - Identify cognitive and physical deficits and behaviors that affect risk of falls.  - [place] fall precautions as indicated by assessment.  - Educate pt/family on patient safety including physical limitations  - Instruct pt to call for assistance with activity based on assessment  - Modify environment to reduce risk of injury  - Provide assistive devices as appropriate  - Consider OT/PT consult to assist with strengthening/mobility  - Encourage toileting schedule  Outcome: Progressing     Problem: DISCHARGE PLANNING  Goal: Discharge to home or other facility with appropriate resources  Description: INTERVENTIONS:  - Identify barriers to discharge w/pt and caregiver  - Include patient/family/discharge partner in discharge planning  - Arrange for needed discharge resources and transportation as appropriate  - Identify discharge learning needs (meds, wound care, etc)  - Arrange for interpreters to assist at discharge as needed  - Consider post-discharge preferences of patient/family/discharge partner  - Complete POLST form as appropriate  - Assess patient's ability to be responsible for managing their own health  - Refer to Case Management Department for coordinating discharge planning if the patient  needs post-hospital services based on physician/LIP order or complex needs related to functional status, cognitive ability or social support system  Outcome: Progressing

## 2024-01-29 NOTE — PHYSICAL THERAPY NOTE
PHYSICAL THERAPY TREATMENT NOTE - INPATIENT    Room Number: 507/507-A     Session: 1     Number of Visits to Meet Established Goals: 3    Presenting Problem: community acquired pneumonia of r lung  Co-Morbidities : DL, HTN  ASSESSMENT     Chart reviewed, and RN approved PT sessions.   Pt improved with overall mobility, and tolerating activity better. Pt with mild reports of SOB. Pt ambulated without device with supervision, however declined stair assessment/training (deferred to \"next time\". Pt would benefit from 1 additional PT session for stair assessment. RN notified.     The AM-PAC '6-Clicks' Inpatient Basic Mobility Short Form was completed and this patient is demonstrating a 11.2% degree of impairment in mobility. Research supports that patients with this level of impairment may benefit from DC recommendation to home with HHPT/OT and intermittent supervision.        DISCHARGE RECOMMENDATIONS  PT Discharge Recommendations: Home with home health PT/OT;Intermittent Supervision     PLAN  PT Treatment Plan: Bed mobility;Endurance;Energy conservation;Patient education;Family education;Gait training;Stair training;Transfer training;Balance training  Rehab Potential : Good  Frequency (Obs): 3-5x/week    CURRENT GOALS     Goal #1 Patient is able to demonstrate supine - sit EOB @ level: modified independent     Goal #2 Patient is able to demonstrate transfers Sit to/from Stand at assistance level: modified independent     Goal #3 Patient is able to ambulate 50 feet with assist device: rolling walker at assistance level: supervision     Goal #4 Pt able to ascend/descend 2 steps with supervision.   Goal #5    Goal #6    Goal Comments: Goals established on 2024 all goals ongoing      SUBJECTIVE  Pt is pleasant and cooperative.     OBJECTIVE  Precautions: Bed/chair alarm    WEIGHT BEARING RESTRICTION  Weight Bearing Restriction: None                PAIN ASSESSMENT   Ratin  Location: denies  pain  Management Techniques: Repositioning;Activity promotion    BALANCE                                                                                                                       Static Sitting: Fair +  Dynamic Sitting: Fair +           Static Standing: Fair  Dynamic Standing: Fair    ACTIVITY TOLERANCE  Pulse: 75  Heart Rate Source: Monitor     BP: (!) 183/113  BP Location: Left arm  BP Method: Automatic  Patient Position: Sitting (after ambulation, RN notified)    O2 WALK  Oxygen Therapy  SPO2% on Room Air at Rest: 100  SPO2% Ambulation on Room Air: 98      AM-PAC '6-Clicks' INPATIENT SHORT FORM - BASIC MOBILITY  How much difficulty does the patient currently have...  Patient Difficulty: Turning over in bed (including adjusting bedclothes, sheets and blankets)?: None   Patient Difficulty: Sitting down on and standing up from a chair with arms (e.g., wheelchair, bedside commode, etc.): None   Patient Difficulty: Moving from lying on back to sitting on the side of the bed?: None   How much help from another person does the patient currently need...   Help from Another: Moving to and from a bed to a chair (including a wheelchair)?: None   Help from Another: Need to walk in hospital room?: None   Help from Another: Climbing 3-5 steps with a railing?: A Little       AM-PAC Score:  Raw Score: 23   Approx Degree of Impairment: 11.2%   Standardized Score (AM-PAC Scale): 56.93   CMS Modifier (G-Code): CI    FUNCTIONAL ABILITY STATUS  Gait Assessment   Functional Mobility/Gait Assessment  Gait Assistance: Supervision  Distance (ft): 75  Assistive Device: Rolling walker  Pattern:  (slow joy, pacing)  Stairs:  (declined stair assessment)    Skilled Therapy Provided    Bed Mobility:  Rolling: NT   Supine<>Sit: NT   Sit<>Supine: NT     Transfer Mobility:  Sit<>Stand: supervision   Stand<>Sit: supervision   Gait: supervision    Therapist's Comments: Pt standing up in room upon arrival. Pt demonstrates fair+  balance with standing activities. Pt demonstrates fair+ balance with ambulation. Encouraged pt to perform stair assessment/training today, however pt declined. RN notified. Upon return to room, BP assessed and found to be 183/113. Advised pt to ambulate 3-4 times per day with RN staff. Educated pt in the importance of safety awareness, calling for assistance when needing to get up. Pt in understanding. Pt left in chair, alarm on.       Patient End of Session: Up in chair;Needs met;Call light within reach;RN aware of session/findings;All patient questions and concerns addressed;Alarm set    PT Session Time: 15 minutes  Gait Training: 15 minutes  Therapeutic Activity: 0 minutes  Therapeutic Exercise: 0 minutes   Neuromuscular Re-education: 0 minutes

## 2024-01-29 NOTE — DISCHARGE INSTRUCTIONS
Sometimes managing your health at home requires assistance.  The [facility] team has recognized your preference to use [facility].  They can be reached by phone at [phone].  The fax number for your reference is [phone].. A representative from the home health agency will contact you or your family to schedule your first visit.          Follow up with PCP in 1-2 weeks and repeat chest x-ray at that time.

## 2024-01-29 NOTE — PROGRESS NOTES
01/29/24 0616   Provider Notification   Reason for Communication Evaluate  (HTN)   Provider Name Other (comment)  (Aicha)   Method of Communication Page   Response Waiting for response   Notification Time 0617         Pt bp 174/78. Pt takes enalapril 5mg daily at 0900 for HTN. Do you want me to give early or add a prn?      Per MD, ok to give pt morning enalapril early.

## 2024-01-29 NOTE — CM/SW NOTE
01/29/24 1041   Choice of Post-Acute Provider   Informed patient of right to choose their preferred provider Yes   List of appropriate post-acute services provided to patient/family with quality data Yes   Patient/family choice [facility]   Information given to Patient     Met with patient, who was alert and oriented, to discuss discharge planning. Discussed PT rec of HH, he was agreeable. Provided accepting  provider list. He chose [facility]. Reserved in aidin.    SW/CM to remain available for support and/or discharge planning.    ANTOINE Carter  Discharge Planner  [phone]

## 2024-01-29 NOTE — PROGRESS NOTES
[facility]     Nephrology Progress Note    Sureshchan P Gondalia Patient Status:  Inpatient    1945 MRN [id number]   Location [facility] 5NW-A Attending Bacilio Weaver,    Hosp Day # 3 PCP Nannette Maza MD       SUBJECTIVE:  Stable this morning.  Denies complaints      Physical Exam:   BP (!) 174/78 (BP Location: Right arm)   Pulse 74   Temp 98 °F (36.7 °C) (Oral)   Resp 18   Ht 5' 6\" (1.676 m)   Wt 165 lb 2 oz (74.9 kg)   SpO2 93%   BMI 26.65 kg/m²   Temp (24hrs), Av.9 °F (36.6 °C), Min:97.4 °F (36.3 °C), Max:98.2 °F (36.8 °C)       Intake/Output Summary (Last 24 hours) at 2024 0753  Last data filed at 2024 0550  Gross per 24 hour   Intake 240 ml   Output 6500 ml   Net -6260 ml     Last 3 Weights   24 2100 165 lb 2 oz (74.9 kg)   24 1510 162 lb (73.5 kg)   06/15/23 1056 162 lb (73.5 kg)   19 1420 165 lb (74.8 kg)   16 1405 158 lb (71.7 kg)   14 1147 148 lb (67.1 kg)     General: Alert and oriented in no apparent distress.  HEENT: No scleral icterus, MMM  Neck: Supple, no GILL or thyromegaly  Cardiac: Irregularly irregular, S1, S2 normal, no murmur or rub  Lungs: Clear without wheezes, rales, rhonchi.    Abdomen: Soft, non-tender. + bowel sounds, no palpable organomegaly  Extremities: Without clubbing, cyanosis or edema.  Neurologic: , moving all extremities  Skin: Warm and dry, no rash        Labs:     Recent Labs   Lab 24  1536 24  0839 24  0628   WBC 11.2* 8.9 7.1   HGB 10.2* 9.7* 9.7*   MCV 78.0* 77.3* 78.1*   .0 270.0 280.0       Recent Labs   Lab 24  1258 24  1852 24  0055 24  0628 24  1244   * 123* 125* 126*  126* 127*   K 3.6 4.1 4.2 3.8  3.8 3.9   CL 88* 93* 95* 97*  97* 96*   CO2 26.0 22.0 24.0 24.0  24.0 23.0   BUN 13 16 16 12  12 11   CREATSERUM 0.84 0.92 0.87 0.97  0.97 0.98   CA 8.6 8.1* 8.1* 8.6  8.6 8.6   * 145* 120* 102*  102* 100*       Recent Labs   Lab  01/26/24  1536 01/27/24  0839 01/28/24  0628   AST 65* 57* 39*   ALB 2.7* 2.4* 2.4*       No results for input(s): \"PGLU\" in the last 168 hours.    Meds:   Current Facility-Administered Medications   Medication Dose Route Frequency    benzonatate (Tessalon) cap 100 mg  100 mg Oral TID PRN    dextromethorphan-guaiFENesin (Robitussin-DM)  mg/5mL oral solution 5 mL  5 mL Oral Q6H PRN    traMADol (Ultram) tab 50 mg  50 mg Oral Q6H PRN    melatonin cap/tab 10 mg  10 mg Oral Nightly PRN    LORazepam (Ativan) tab 1 mg  1 mg Oral BID PRN    lidocaine-menthol 4-1 % patch 1 patch  1 patch Transdermal Daily    iron sucrose (Venofer) 20 mg/mL injection 200 mg  200 mg Intravenous Daily    atorvastatin (Lipitor) tab 40 mg  40 mg Oral Nightly    enalapril (Vasotec) tab 5 mg  5 mg Oral Daily    pantoprazole (Protonix) DR tab 40 mg  40 mg Oral QAM AC    multivitamin (Tab-A-Tammie/Beta Carotene) tab 1 tablet  1 tablet Oral Daily    tiZANidine (Zanaflex) tab 4 mg  4 mg Oral TID PRN    heparin (Porcine) 5000 UNIT/ML injection 5,000 Units  5,000 Units Subcutaneous Q8H YAMILE    acetaminophen (Tylenol Extra Strength) tab 500 mg  500 mg Oral Q4H PRN    ondansetron (Zofran) 4 MG/2ML injection 4 mg  4 mg Intravenous Q6H PRN    metoclopramide (Reglan) 5 mg/mL injection 10 mg  10 mg Intravenous Q8H PRN    polyethylene glycol (PEG 3350) (Miralax) 17 g oral packet 17 g  17 g Oral Daily PRN    sennosides (Senokot) tab 17.2 mg  17.2 mg Oral Nightly PRN    bisacodyl (Dulcolax) 10 MG rectal suppository 10 mg  10 mg Rectal Daily PRN    fleet enema (Fleet) 7-19 GM/118ML rectal enema 133 mL  1 enema Rectal Once PRN    piperacillin-tazobactam (Zosyn) 3.375 g in dextrose 5% 100 mL IVPB-ADDV  3.375 g Intravenous Q8H    sodium chloride 0.9% infusion   Intravenous Continuous         Impression/Plan:      Hyponatremia- acute on chronic; baseline low 130s; unclear etilogy. Denies excessive fluid intake. Eats fairly well generally, although he has not been  eating much prior to presentation.  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  Discontinue intravenous fluids today.  Would recommend modest fluid restriction moving forward 1800 mL to 2000 mL daily  PNA- on abx; supportive care  HTN-blood pressure noted.  Continue to follow and consider adjusting antihypertensive agents    Questions/concerns were discussed with patient and/or family by bedside.    Nephrology will discontinue follow-up.  Please call with questions or concerns      John Malin MD  1/29/2024  7:53 AM

## 2024-01-29 NOTE — OCCUPATIONAL THERAPY NOTE
OCCUPATIONAL THERAPY EVALUATION - INPATIENT    Room Number: 507/507-A  Evaluation Date: 1/29/2024     Type of Evaluation: Initial  Presenting Problem: penumonia, hyponatremia, SIADH    Physician Order: IP Consult to Occupational Therapy  Reason for Therapy:  ADL/IADL Dysfunction and Discharge Planning    History: Patient is a 78 year old male admitted on 1/26/2024 with Presenting Problem: penumonia, hyponatremia, SIADH.  Co-Morbidities : DL, HTN    ASSESSMENT   Patient met all OT goals at mod I to supervision level. Patient reports no further questions/concerns at this time.     The AM-PAC ' '6-Clicks' Inpatient Daily Activity Short Form was completed and this patient is demonstrating an Approx Degree of Impairment: 0% in activities of daily living. Research supports that patients with this level of impairment often benefit from discharge home.       OT Discharge Recommendations: Home with home health PT/OT (w/ initial assist for IADLs)  OT Device Recommendations: None (has appropriate equipment at home)    WEIGHT BEARING RESTRICTION  Weight Bearing Restriction: None                Recommendations for nursing staff:   Transfers: supervision, no device  Toileting location: Toilet    EVALUATION SESSION:  Patient at start of session: supine  FUNCTIONAL TRANSFER ASSESSMENT  Sit to Stand: Chair; Edge of Bed  Edge of Bed: Modified Independent  Chair: Modified Independent  Toilet Transfer: Supervision    BED MOBILITY  Rolling: Modified Independent  Supine to Sit : Modified Independent  Sit to Supine (OT): Modified Independent  Scooting: mod I    BALANCE ASSESSMENT  Static Sitting: Modified Independent  Static Standing: Modified Independent  Standing Bilateral: Supervision    FUNCTIONAL ADL ASSESSMENT  Eating: Modified Independent  Grooming Seated: Modified Independent  LB Dressing Seated: Modified Independent  Toileting Seated: Modified Independent      ACTIVITY TOLERANCE: WFL                         O2  SATURATIONS  Oxygen Therapy  SPO2% on Room Air at Rest: 100  SPO2% Ambulation on Room Air: 96    COGNITION  Alert, oriented x 3  Follows simple motor commands with occ repetition   COGNITION ASSESSMENTS       Upper Extremity:   ROM: within functional limits   Strength: is within functional limits   Coordination:  Gross motor: WFL  Fine motor: WFL  Sensation: no c/o    EDUCATION PROVIDED  Patient : Role of Occupational Therapy; Plan of Care; Discharge Recommendations  Patient's Response to Education: Verbalized Understanding; Returned Demonstration    Equipment used: none  Demonstrates functional use    Therapist comments: OT educated patient on safety,  sequencing, energy conservation, pain management, home modifications and adaptive equipment to increase independence with ADLs.      Patient End of Session: Up in chair;With  staff;Needs met;Call light within reach;RN aware of session/findings;All patient questions and concerns addressed;Alarm set;Discussed recommendations with /    OCCUPATIONAL PROFILE    HOME SITUATION  Type of Home: House  Home Layout: Two level;Able to live on main level  Lives With: Alone    Toilet and Equipment: Standard height toilet;Toilet riser;Grab bar  Shower/Tub and Equipment: Walk-in shower;Grab bar;Shower chair  Other Equipment: Reacher;Long-handled shoehorn    Occupation/Status: retired  Hand Dominance: Right  Drives: Yes  Patient Regularly Uses: Reading glasses (h/o cataracts)    Prior Level of Function: Independent w/ ADL and IADLs without use of adaptive device.  Patient reports he drives, does own shopping and laundry.    SUBJECTIVE  PAIN ASSESSMENT  Ratin  Location: denies       OBJECTIVE  Precautions: Bed/chair alarm  Fall Risk: Standard fall risk    WEIGHT BEARING RESTRICTION  Weight Bearing Restriction: None                AM-PAC ‘6-Clicks’ Inpatient Daily Activity Short Form  -   Putting on and taking off regular lower body clothing?: None  -    Bathing (including washing, rinsing, drying)?: None  -   Toileting, which includes using toilet, bedpan or urinal? : None  -   Putting on and taking off regular upper body clothing?: None  -   Taking care of personal grooming such as brushing teeth?: None  -   Eating meals?: None    AM-PAC Score:  Score: 24  Approx Degree of Impairment: 0%  Standardized Score (AM-PAC Scale): 57.54      ADDITIONAL TESTS     NEUROLOGICAL FINDINGS        PLAN   Patient has been evaluated and presents with no skilled Occupational Therapy needs at this time.  Patient discharged from Occupational Therapy services.  Please re-order if a new functional limitation presents during this admission.      Patient Evaluation Complexity Level:   Occupational Profile/Medical History MODERATE - Expanded review of history including review of medical or therapy record   Specific performance deficits impacting engagement in ADL/IADL MODERATE  3 - 5 performance deficits   Client Assessment/Performance Deficits LOW - No comorbidities nor modifications of tasks    Clinical Decision Making LOW - Analysis of occupational profile, problem-focused assessments, limited treatment options    Overall Complexity LOW     OT Session Time: 30 minutes  Self-Care Home Management: 10 minutes

## 2024-01-29 NOTE — PLAN OF CARE
Patient AAOx4, can be forgetful. Room air. Tele NSR. BP hypertensive, hospitalist aware and meds adjusted. Tramadol and lidocaine patch for pain. IVF discontinued. PO augmentin. Up self/SB. Patient rounded on routinely. Patient updated on plan of care.      Problem: Patient/Family Goals  Goal: Patient/Family Long Term Goal  Description: Patient's Long Term Goal: Home     Interventions:  - Abx, IVF, labs   - See additional Care Plan goals for specific interventions  Outcome: Progressing  Goal: Patient/Family Short Term Goal  Description: Patient's Short Term Goal: electrolyte balance   1/27 noc: improve sodium   1/28 NOC: sleep well, improve pain     Interventions:   - FR, coverage, nephrology on consult   - See additional Care Plan goals for specific interventions  Outcome: Progressing     Problem: RESPIRATORY - ADULT  Goal: Achieves optimal ventilation and oxygenation  Description: INTERVENTIONS:  - Assess for changes in respiratory status  - Assess for changes in mentation and behavior  - Position to facilitate oxygenation and minimize respiratory effort  - Oxygen supplementation based on oxygen saturation or ABGs  - Provide Smoking Cessation handout, if applicable  - Encourage broncho-pulmonary hygiene including cough, deep breathe, Incentive Spirometry  - Assess the need for suctioning and perform as needed  - Assess and instruct to report SOB or any respiratory difficulty  - Respiratory Therapy support as indicated  - Manage/alleviate anxiety  - Monitor for signs/symptoms of CO2 retention  Outcome: Progressing     Problem: PAIN - ADULT  Goal: Verbalizes/displays adequate comfort level or patient's stated pain goal  Description: INTERVENTIONS:  - Encourage pt to monitor pain and request assistance  - Assess pain using appropriate pain scale  - Administer analgesics based on type and severity of pain and evaluate response  - Implement non-pharmacological measures as appropriate and evaluate response  - Consider  cultural and social influences on pain and pain management  - Manage/alleviate anxiety  - Utilize distraction and/or relaxation techniques  - Monitor for opioid side effects  - Notify MD/LIP if interventions unsuccessful or patient reports new pain  - Anticipate increased pain with activity and pre-medicate as appropriate  Outcome: Progressing     Problem: SAFETY ADULT - FALL  Goal: Free from fall injury  Description: INTERVENTIONS:  - Assess pt frequently for physical needs  - Identify cognitive and physical deficits and behaviors that affect risk of falls.  - [place] fall precautions as indicated by assessment.  - Educate pt/family on patient safety including physical limitations  - Instruct pt to call for assistance with activity based on assessment  - Modify environment to reduce risk of injury  - Provide assistive devices as appropriate  - Consider OT/PT consult to assist with strengthening/mobility  - Encourage toileting schedule  Outcome: Progressing     Problem: DISCHARGE PLANNING  Goal: Discharge to home or other facility with appropriate resources  Description: INTERVENTIONS:  - Identify barriers to discharge w/pt and caregiver  - Include patient/family/discharge partner in discharge planning  - Arrange for needed discharge resources and transportation as appropriate  - Identify discharge learning needs (meds, wound care, etc)  - Arrange for interpreters to assist at discharge as needed  - Consider post-discharge preferences of patient/family/discharge partner  - Complete POLST form as appropriate  - Assess patient's ability to be responsible for managing their own health  - Refer to Case Management Department for coordinating discharge planning if the patient needs post-hospital services based on physician/LIP order or complex needs related to functional status, cognitive ability or social support system  Outcome: Progressing

## 2024-01-30 VITALS
WEIGHT: 165.13 LBS | TEMPERATURE: 98 F | OXYGEN SATURATION: 99 % | SYSTOLIC BLOOD PRESSURE: 153 MMHG | HEIGHT: 66 IN | DIASTOLIC BLOOD PRESSURE: 67 MMHG | HEART RATE: 81 BPM | RESPIRATION RATE: 18 BRPM | BODY MASS INDEX: 26.54 KG/M2

## 2024-01-30 LAB
ANION GAP SERPL CALC-SCNC: 8 MMOL/L (ref 0–18)
BUN BLD-MCNC: 9 MG/DL (ref 9–23)
CALCIUM BLD-MCNC: 9.5 MG/DL (ref 8.5–10.1)
CHLORIDE SERPL-SCNC: 101 MMOL/L (ref 98–112)
CO2 SERPL-SCNC: 24 MMOL/L (ref 21–32)
CREAT BLD-MCNC: 0.64 MG/DL
EGFRCR SERPLBLD CKD-EPI 2021: 98 ML/MIN/1.73M2 (ref 60–?)
GLUCOSE BLD-MCNC: 105 MG/DL (ref 70–99)
OSMOLALITY SERPL CALC.SUM OF ELEC: 275 MOSM/KG (ref 275–295)
POTASSIUM SERPL-SCNC: 3.8 MMOL/L (ref 3.5–5.1)
SODIUM SERPL-SCNC: 133 MMOL/L (ref 136–145)

## 2024-01-30 PROCEDURE — 99239 HOSP IP/OBS DSCHRG MGMT >30: CPT | Performed by: INTERNAL MEDICINE

## 2024-01-30 RX ORDER — ENALAPRIL MALEATE 10 MG/1
10 TABLET ORAL DAILY
Qty: 30 TABLET | Refills: 0 | Status: SHIPPED | OUTPATIENT
Start: 2024-01-31

## 2024-01-30 RX ORDER — TRAMADOL HYDROCHLORIDE 50 MG/1
50 TABLET ORAL EVERY 6 HOURS PRN
Qty: 20 TABLET | Refills: 0 | Status: SHIPPED | OUTPATIENT
Start: 2024-01-30

## 2024-01-30 RX ORDER — BENZONATATE 100 MG/1
100 CAPSULE ORAL 3 TIMES DAILY PRN
Qty: 15 CAPSULE | Refills: 0 | Status: SHIPPED | OUTPATIENT
Start: 2024-01-30

## 2024-01-30 RX ORDER — AMOXICILLIN AND CLAVULANATE POTASSIUM 875; 125 MG/1; MG/1
875 TABLET, FILM COATED ORAL EVERY 12 HOURS SCHEDULED
Qty: 12 TABLET | Refills: 0 | Status: SHIPPED | OUTPATIENT
Start: 2024-01-30 | End: 2024-02-05

## 2024-01-30 NOTE — PAYOR COMM NOTE
--------------  DISCHARGE REVIEW    Payor: [facility]  Subscriber #:  [id number]  Authorization Number: [id number]    Admit date: 24  Admit time:   8:34 PM  Discharge Date: 2024 12:46 PM     Admitting Physician: Nayan Blount MD  Attending Physician:  No att. providers found  Primary Care Physician: Nannette Maza MD          Discharge Summary Notes        Discharge Summary signed by Bacilio Weaver DO at 2024  4:03 PM       Author: Bacilio Weaver DO Specialty: HOSPITALIST, Internal Medicine Author Type: Physician    Filed: 2024  4:03 PM Date of Service: 2024  4:00 PM Status: Signed    : Bacilio Weaver DO (Physician)           [facility]     Sureshchan P Gondalia Patient Status:  Inpatient    1945 MRN [id number]   Location [facility] 5N-A Attending No att. providers found   Hosp Day # 4 PCP Nannette Maza MD     Date of Admission: 2024  Date of Discharge: 2024  Discharge Disposition: Home Health Care Services    Discharge Diagnosis:   Right-sided pneumonia  Acute on chronic hyponatremia, improved  Generalized weakness  Essential hypertension  Moderate tricuspid regurgitation  Dyslipidemia  GERD  Mildly elevated LFTs, improved  Microcytic anemia with iron deficiency  History of paroxysmal atrial fibrillation with prior cardioversion  Memory loss  Mitral regurgitation with bioprosthesis valve    History of Present Illness: Sureshchan P Gondalia is a 78 year old male with PMhx of DL, HTN, presents with SOB. Pt has had symptoms for the past few weeks. He has some assocaited dizziness. He denies any CP. He has cough as well. No F/C. No N/V/D/C or abd pain. He has not been eating much over the past few days. He states he had some rice and soup yesterday and some ensure.  No recent travel or sick contacts he is aware of. He denies any focal weakness, numbness or tingling. No vision changes or headaches. He is very weak at home and  has not been ambulating much. He denies any leg edema.     Brief Synopsis: Patient was started on Unasyn and azithromycin for right-sided pneumonia.  Procalcitonin was negative but imaging was concerning for it.  RVP negative.  MRSA nares and urine/Legionella urine antigens negative.  He was on room air the entire hospitalization.  Nephrology consulted for acute on chronic hyponatremia.  This improved with gentle IV fluids, loop diuretics and fluid restriction.  He was discharged on oral antibiotics with outpatient follow-up with PCP.    Lace+ Score: 62  59-90 High Risk  29-58 Medium Risk  0-28   Low Risk       TCM Follow-Up Recommendation:  LACE > 58: High Risk of readmission after discharge from the hospital.    Procedures during hospitalization:   None    Incidental or significant findings and recommendations (brief descriptions):  None    Lab/Test results pending at Discharge:   None    Consultants:  Nephrology    Discharge Medication List:     Discharge Medications        START taking these medications        Instructions Prescription details   amoxicillin clavulanate 875-125 MG Tabs  Commonly known as: Augmentin      Take 1 tablet (875 mg total) by mouth every 12 (twelve) hours for 6 days.   Stop taking on: February 5, 2024  Quantity: 12 tablet  Refills: 0     benzonatate 100 MG Caps  Commonly known as: Tessalon      Take 1 capsule (100 mg total) by mouth 3 (three) times daily as needed for cough.   Quantity: 15 capsule  Refills: 0     traMADol 50 MG Tabs  Commonly known as: Ultram      Take 1 tablet (50 mg total) by mouth every 6 (six) hours as needed for Pain.   Quantity: 20 tablet  Refills: 0            CHANGE how you take these medications        Instructions Prescription details   enalapril 10 MG Tabs  Commonly known as: Vasotec  Start taking on: January 31, 2024  What changed:   medication strength  how much to take      Take 1 tablet (10 mg total) by mouth daily.   Quantity: 30 tablet  Refills: 0             CONTINUE taking these medications        Instructions Prescription details   atorvastatin 40 MG Tabs  Commonly known as: Lipitor      Take 1 tablet (40 mg total) by mouth As Directed.   Refills: 0     Centrum Adults Tabs      Take by mouth daily.   Refills: 0     cholecalciferol 125 MCG (5000 UT) Tabs  Commonly known as: Vitamin D3      Take 1 tablet (5,000 Units total) by mouth 2 (two) times daily.   Refills: 0     Coenzyme Q10 300 MG Caps      Take by mouth.   Refills: 0     LORazepam 1 MG Tabs  Commonly known as: Ativan      Take 1 tablet (1 mg total) by mouth as needed.   Refills: 0     Melatonin 10 MG Caps      Take 10 mg by mouth nightly as needed (insomnia).   Refills: 0     Meloxicam 15 MG Tabs       Refills: 0     omega-3-acid ethyl esters 1 g Caps  Commonly known as: Lovaza      Take 2 capsules (2 g total) by mouth 2 (two) times daily.   Refills: 0     pantoprazole 40 MG Tbec  Commonly known as: Protonix      TAKE 1 TABLET BY MOUTH TWO TIMES A DAY 30 MINUTES BEFORE MEALS   Quantity: 180 tablet  Refills: 0     tiZANidine HCl 4 MG Caps  Commonly known as: ZANAFLEX      Take 1 capsule (4 mg total) by mouth 3 (three) times daily.   Refills: 0            STOP taking these medications      Esomeprazole Magnesium 40 MG Cpdr  Commonly known as: NEXIUM                  Where to Get Your Medications        These medications were sent to [id number] [place] [address], [phone], [phone]  [facility] [address], [facility] [address]      Phone: [phone]   amoxicillin clavulanate 875-125 MG Tabs  benzonatate 100 MG Caps  enalapril 10 MG Tabs  traMADol 50 MG Tabs         [place]    Follow-up appointment:   Nannette Maza MD  [address] [place] [id number] [phone]    Follow up in 1 week(s)      Appointments for Next 30 Days 1/30/2024 - 2/29/2024      None            Vital signs:  Temp:  [97.6 °F (36.4 °C)-98.5 °F  (36.9 °C)] 97.7 °F (36.5 °C)  Pulse:  [76-92] 81  Resp:  [18] 18  BP: (152-159)/(67-93) 153/67  SpO2:  [94 %-99 %] 99 %    Physical Exam:    See progress note dated same day.  -----------------------------------------------------------------------------------------------  PATIENT DISCHARGE INSTRUCTIONS: See electronic chart    Bacilio Weaver DO    Time spent:  32 minutes    Electronically signed by Bacilio Weaver DO on 1/30/2024  4:03 PM         REVIEWER COMMENTS

## 2024-01-30 NOTE — PLAN OF CARE
Problem: Patient/Family Goals  Goal: Patient/Family Long Term Goal  Description: Patient's Long Term Goal: Home     Interventions:  - Abx, IVF, labs   - See additional Care Plan goals for specific interventions  1/30/2024 1233 by Nahomi Hobson RN  Outcome: Completed  1/30/2024 0921 by Nahomi Hobson RN  Outcome: Progressing  Goal: Patient/Family Short Term Goal  Description: Patient's Short Term Goal: electrolyte balance   1/27 noc: improve sodium   1/28 NOC: sleep well, improve pain     Interventions:   - FR, coverage, nephrology on consult   - See additional Care Plan goals for specific interventions  1/30/2024 1233 by Nahomi Hobson RN  Outcome: Completed  1/30/2024 0921 by Nahomi Hobson RN  Outcome: Progressing     Problem: RESPIRATORY - ADULT  Goal: Achieves optimal ventilation and oxygenation  Description: INTERVENTIONS:  - Assess for changes in respiratory status  - Assess for changes in mentation and behavior  - Position to facilitate oxygenation and minimize respiratory effort  - Oxygen supplementation based on oxygen saturation or ABGs  - Provide Smoking Cessation handout, if applicable  - Encourage broncho-pulmonary hygiene including cough, deep breathe, Incentive Spirometry  - Assess the need for suctioning and perform as needed  - Assess and instruct to report SOB or any respiratory difficulty  - Respiratory Therapy support as indicated  - Manage/alleviate anxiety  - Monitor for signs/symptoms of CO2 retention  1/30/2024 1233 by Nahomi Hobson RN  Outcome: Completed  1/30/2024 0921 by Nahomi Hobson RN  Outcome: Progressing     Problem: PAIN - ADULT  Goal: Verbalizes/displays adequate comfort level or patient's stated pain goal  Description: INTERVENTIONS:  - Encourage pt to monitor pain and request assistance  - Assess pain using appropriate pain scale  - Administer analgesics based on type and severity of pain and evaluate response  - Implement non-pharmacological measures as appropriate and evaluate  response  - Consider cultural and social influences on pain and pain management  - Manage/alleviate anxiety  - Utilize distraction and/or relaxation techniques  - Monitor for opioid side effects  - Notify MD/LIP if interventions unsuccessful or patient reports new pain  - Anticipate increased pain with activity and pre-medicate as appropriate  1/30/2024 1233 by Nahomi Hobson, RN  Outcome: Completed  1/30/2024 0921 by Nahomi Hobson, RN  Outcome: Progressing     Problem: SAFETY ADULT - FALL  Goal: Free from fall injury  Description: INTERVENTIONS:  - Assess pt frequently for physical needs  - Identify cognitive and physical deficits and behaviors that affect risk of falls.  - [place] fall precautions as indicated by assessment.  - Educate pt/family on patient safety including physical limitations  - Instruct pt to call for assistance with activity based on assessment  - Modify environment to reduce risk of injury  - Provide assistive devices as appropriate  - Consider OT/PT consult to assist with strengthening/mobility  - Encourage toileting schedule  1/30/2024 1233 by Nahomi Hobson, RN  Outcome: Completed  1/30/2024 0921 by Nahomi Hobson RN  Outcome: Progressing     Problem: DISCHARGE PLANNING  Goal: Discharge to home or other facility with appropriate resources  Description: INTERVENTIONS:  - Identify barriers to discharge w/pt and caregiver  - Include patient/family/discharge partner in discharge planning  - Arrange for needed discharge resources and transportation as appropriate  - Identify discharge learning needs (meds, wound care, etc)  - Arrange for interpreters to assist at discharge as needed  - Consider post-discharge preferences of patient/family/discharge partner  - Complete POLST form as appropriate  - Assess patient's ability to be responsible for managing their own health  - Refer to Case Management Department for coordinating discharge planning if the patient needs post-hospital services based on  physician/LIP order or complex needs related to functional status, cognitive ability or social support system  1/30/2024 1233 by Nahomi Hobson, RN  Outcome: Completed  1/30/2024 0921 by Nahomi Hobson, RN  Outcome: Progressing

## 2024-01-30 NOTE — PROGRESS NOTES
[facility]     Hospitalist Progress Note     Kash Middletonmartin Patient Status:  Inpatient    1945 MRN [id number]   Location [facility] 5NW-A Attending Bacilio Weaver,    Hosp Day # 4 PCP Nannette Maza MD     Chief Complaint: SOB, cough, generalized weakness    Subjective:     Patient feels better and states he is ready to go home.    Objective:    Review of Systems:   A comprehensive review of systems was completed; pertinent positive and negatives stated in subjective.    Vital signs:  Temp:  [97.6 °F (36.4 °C)-98.5 °F (36.9 °C)] 98.5 °F (36.9 °C)  Pulse:  [75-92] 81  Resp:  [18-19] 18  BP: (150-183)/() 159/89  SpO2:  [94 %-100 %] 98 %    Physical Exam:    General: No acute distress, awake and alert  Respiratory: No wheezes, rales on right side but improved  Cardiovascular: S1, S2, regular rate and rhythm  Abdomen: Soft, Non-tender, non-distended, positive bowel sounds  Extremities: No edema    Diagnostic Data:    Labs:  Recent Labs   Lab 24  1536 24  0839 24  0628   WBC 11.2* 8.9 7.1   HGB 10.2* 9.7* 9.7*   MCV 78.0* 77.3* 78.1*   .0 270.0 280.0     Recent Labs   Lab 24  1536 24  2023 24  0839 24  1258 24  0628 24  1244 24  0637 24  0727   GLU 80   < > 90  90   < > 102*  102* 100* 97 105*   BUN 20   < > 15  15   < > 12  12 11 8* 9   CREATSERUM 0.94   < > 0.81  0.81   < > 0.97  0.97 0.98 0.74 0.64*   CA 9.1   < > 8.7  8.7   < > 8.6  8.6 8.6 9.1 9.5   ALB 2.7*  --  2.4*  --  2.4*  --   --   --    *   < > 120*  120*   < > 126*  126* 127* 133* 133*   K 4.4   < > 3.7  3.7   < > 3.8  3.8 3.9 4.0 3.8   CL 85*   < > 87*  87*   < > 97*  97* 96* 104 101   CO2 25.0   < > 25.0  25.0   < > 24.0  24.0 23.0 22.0 24.0   ALKPHO 123*  --  109  --  114  --   --   --    AST 65*  --  57*  --  39*  --   --   --    BILT 0.8  --  0.8  --  0.5  --   --   --    TP 7.2  --  6.4  --  6.4  --   --   --     < > = values in  this interval not displayed.     Estimated Creatinine Clearance: 87.2 mL/min (A) (based on SCr of 0.64 mg/dL (L)).    Recent Labs   Lab 01/26/24  1536   TROPHS 19     No results for input(s): \"PTP\", \"INR\" in the last 168 hours.     Microbiology  Hospital Encounter on 01/26/24   1. Blood Culture     Status: None (Preliminary result)    Collection Time: 01/26/24  4:49 PM    Specimen: Blood,peripheral   Result Value Ref Range    Blood Culture Result No Growth 3 Days N/A     Imaging: Reviewed in Epic.    Medications:    amoxicillin potassium and clavulanate  875 mg Oral Q12H    enalapril  10 mg Oral Daily    lidocaine-menthol  1 patch Transdermal Daily    atorvastatin  40 mg Oral Nightly    pantoprazole  40 mg Oral QAM AC    multivitamin  1 tablet Oral Daily    heparin  5,000 Units Subcutaneous Q8H [facility]       Assessment & Plan:      #Generalized weakness  -PT/OT recommending HHC     #Right-sided pneumonia  -Follow blood cultures, so far NGTD  -MRSA nares, urine strep/legionella negative  -RVP negative  -PCT only 0.08 but continue empiric abx. Given lasix 20 mg x 2 as findings could be asymmetric pulm edema    #Acute on chronic hyponatremia. Improved  -Na 117 on arrival, was 129 in April 2023  -Nephrology consult  -1.5L Fluid restriction   -Nephrology consulted  -Monitor BMP    #Elevated proBNP  -Echo last month showed pEF and no major valvular abnormalities except moderate TR  -Lasix x 2      #Ess HTN  -Increase home ACEi  -PRN hydralazine    #Dyslipidemia  -Statin     #GERD  -PPI     #Mild ELevated LFTs  -Trended down    #Microcytic anemia with iron deficiency  -IV iron given  -Monitor hgb    #Hx PAF s/p cardioversion  -No longer on AC or rate control meds    #Memory loss  -Previously was on aricept, stopped as he was not tolerating it    #Hx of mitral regurgitation s/p bioprosthesis    Bacilio Weaver DO    Supplementary Documentation:     Quality:  DVT Mechanical Prophylaxis:   SCDs,    DVT Pharmacologic Prophylaxis    Medication    heparin (Porcine) 5000 UNIT/ML injection 5,000 Units   Code Status: Full  Macario: No urinary catheter in place  ANNE: 1/30/2024    Discharge is dependent on: Clinical state, consultant recs  At this point Mr. Gondalia is expected to be discharge to: Home    The 21st Century Cures Act makes medical notes like these available to patients in the interest of transparency. Please be advised this is a medical document. Medical documents are intended to carry relevant information, facts as evident, and the clinical opinion of the practitioner. The medical note is intended as peer to peer communication and may appear blunt or direct. It is written in medical language and may contain abbreviations or verbiage that are unfamiliar.

## 2024-01-30 NOTE — PLAN OF CARE
Problem: Patient/Family Goals  Goal: Patient/Family Long Term Goal  Description: Patient's Long Term Goal: Home     Interventions:  - Abx, IVF, labs   - See additional Care Plan goals for specific interventions  Outcome: Progressing  Goal: Patient/Family Short Term Goal  Description: Patient's Short Term Goal: electrolyte balance   1/27 noc: improve sodium   1/28 NOC: sleep well, improve pain     Interventions:   - FR, coverage, nephrology on consult   - See additional Care Plan goals for specific interventions  Outcome: Progressing     Problem: RESPIRATORY - ADULT  Goal: Achieves optimal ventilation and oxygenation  Description: INTERVENTIONS:  - Assess for changes in respiratory status  - Assess for changes in mentation and behavior  - Position to facilitate oxygenation and minimize respiratory effort  - Oxygen supplementation based on oxygen saturation or ABGs  - Provide Smoking Cessation handout, if applicable  - Encourage broncho-pulmonary hygiene including cough, deep breathe, Incentive Spirometry  - Assess the need for suctioning and perform as needed  - Assess and instruct to report SOB or any respiratory difficulty  - Respiratory Therapy support as indicated  - Manage/alleviate anxiety  - Monitor for signs/symptoms of CO2 retention  Outcome: Progressing     Problem: PAIN - ADULT  Goal: Verbalizes/displays adequate comfort level or patient's stated pain goal  Description: INTERVENTIONS:  - Encourage pt to monitor pain and request assistance  - Assess pain using appropriate pain scale  - Administer analgesics based on type and severity of pain and evaluate response  - Implement non-pharmacological measures as appropriate and evaluate response  - Consider cultural and social influences on pain and pain management  - Manage/alleviate anxiety  - Utilize distraction and/or relaxation techniques  - Monitor for opioid side effects  - Notify MD/LIP if interventions unsuccessful or patient reports new pain  -  Anticipate increased pain with activity and pre-medicate as appropriate  Outcome: Progressing     Problem: SAFETY ADULT - FALL  Goal: Free from fall injury  Description: INTERVENTIONS:  - Assess pt frequently for physical needs  - Identify cognitive and physical deficits and behaviors that affect risk of falls.  - [place] fall precautions as indicated by assessment.  - Educate pt/family on patient safety including physical limitations  - Instruct pt to call for assistance with activity based on assessment  - Modify environment to reduce risk of injury  - Provide assistive devices as appropriate  - Consider OT/PT consult to assist with strengthening/mobility  - Encourage toileting schedule  Outcome: Progressing     Problem: DISCHARGE PLANNING  Goal: Discharge to home or other facility with appropriate resources  Description: INTERVENTIONS:  - Identify barriers to discharge w/pt and caregiver  - Include patient/family/discharge partner in discharge planning  - Arrange for needed discharge resources and transportation as appropriate  - Identify discharge learning needs (meds, wound care, etc)  - Arrange for interpreters to assist at discharge as needed  - Consider post-discharge preferences of patient/family/discharge partner  - Complete POLST form as appropriate  - Assess patient's ability to be responsible for managing their own health  - Refer to Case Management Department for coordinating discharge planning if the patient needs post-hospital services based on physician/LIP order or complex needs related to functional status, cognitive ability or social support system  Outcome: Progressing

## 2024-01-30 NOTE — PHYSICAL THERAPY NOTE
Attempted to see Pt this AM - RN aware of attempt.  Pt requesting to rest, and make a few phone calls.  Will f/u later today if time permits, after all other patients are attempted per tentative schedule.

## 2024-01-30 NOTE — PAYOR COMM NOTE
--------------  1/29 CONTINUED STAY REVIEW    Payor: [employer]  Subscriber #:  [id number]  Authorization Number: [id number]    HOSPITALIST:    Patient overall feeling better.      Vital signs:  Temp:  [97.4 °F (36.3 °C)-98.2 °F (36.8 °C)] 98 °F (36.7 °C)  Pulse:  [72-85] 77  Resp:  [18-20] 19  BP: (136-183)/() 150/81  SpO2:  [93 %-100 %] 100 %     Physical Exam:    General: No acute distress, awake and alert  Respiratory: No wheezes, rales on right side  Cardiovascular: S1, S2, regular rate and rhythm  Abdomen: Soft, Non-tender, non-distended, positive bowel sounds  Extremities: No edema     Lab 01/26/24  1536 01/26/24  2023 01/27/24  0839 01/27/24  1258 01/28/24  0628 01/28/24  1244 01/29/24  0637   GLU 80   < > 90  90   < > 102*  102* 100* 97   BUN 20   < > 15  15   < > 12  12 11 8*   CREATSERUM 0.94   < > 0.81  0.81   < > 0.97  0.97 0.98 0.74   CA 9.1   < > 8.7  8.7   < > 8.6  8.6 8.6 9.1   ALB 2.7*  --  2.4*  --  2.4*  --   --    *   < > 120*  120*   < > 126*  126* 127* 133*   K 4.4   < > 3.7  3.7   < > 3.8  3.8 3.9 4.0   CL 85*   < > 87*  87*   < > 97*  97* 96* 104   CO2 25.0   < > 25.0  25.0   < > 24.0  24.0 23.0 22.0   ALKPHO 123*  --  109  --  114  --   --    AST 65*  --  57*  --  39*  --   --    BILT 0.8  --  0.8  --  0.5  --   --    TP 7.2  --  6.4  --  6.4  --   --      Medications:    amoxicillin potassium and clavulanate  875 mg Oral Q12H    lidocaine-menthol  1 patch Transdermal Daily    atorvastatin  40 mg Oral Nightly    enalapril  5 mg Oral Daily    pantoprazole  40 mg Oral QAM AC    multivitamin  1 tablet Oral Daily    heparin  5,000 Units Subcutaneous Q8H [facility]         Assessment & Plan:   #Generalized weakness  -PT/OT recommending HHC     #Right-sided pneumonia  -Follow blood cultures, so far NGTD  -MRSA nares, urine strep/legionella negative  -RVP negative  -PCT only 0.08 but continue empiric abx for now. Given lasix 20 mg x 2 as findings could be asymmetric  pulm edema     #Acute on chronic hyponatremia. Improving  -Na 117 on arrival, was 129 in April 2023  -Nephrology consult  -1.5L Fluid restriction   -Nephrology consulted  -Monitor BMP     #Elevated proBNP  -Echo last month showed pEF and no major valvular abnormalities except moderate TR  -Lasix x 2      #Ess HTN  -Increase home ACEi  -PRN hydralazine     #Dyslipidemia  -Statin     #GERD  -PPI     #Mild ELevated LFTs  -Trended down     #Microcytic anemia with iron deficiency  -IV iron given  -Monitor hgb     #Hx PAF s/p cardioversion  -No longer on AC or rate control meds     #Memory loss  -Previously was on aricept, stopped as he was not tolerating it     #Hx of mitral regurgitation s/p bioprosthesis          MEDICATIONS ADMINISTERED IN LAST 1 DAY:  amoxicillin clavulanate (Augmentin) 875-125 MG per tab 875 mg       Date Action Dose Route User    1/30/2024 0907 Given 875 mg Oral Nahomi Hobson RN    1/29/2024 2109 Given 875 mg Oral Ester Reyes RN    1/29/2024 1425 Given 875 mg Oral Nahomi Hobson RN          atorvastatin (Lipitor) tab 40 mg       Date Action Dose Route User    1/29/2024 2109 Given 40 mg Oral Ester Reyes RN          benzonatate (Tessalon) cap 100 mg       Date Action Dose Route User    1/29/2024 1425 Given 100 mg Oral Nahomi Hobson RN          enalapril (Vasotec) tab 10 mg       Date Action Dose Route User    1/30/2024 0907 Given 10 mg Oral Nahomi Hobson RN          heparin (Porcine) 5000 UNIT/ML injection 5,000 Units       Date Action Dose Route User    1/30/2024 0618 Given 5,000 Units Subcutaneous (Right Lower Abdomen) Ester Reyes RN    1/29/2024 2107 Given 5,000 Units Subcutaneous (Left Lower Abdomen) Ester Reyes RN    1/29/2024 1425 Given 5,000 Units Subcutaneous (Left Lower Abdomen) Nahomi Hobson RN          hydrALAzine (Apresoline) 20 mg/mL injection 10 mg       Date Action Dose Route User    1/30/2024 0011 Given 10 mg Intravenous Ester Reyes RN          lidocaine-menthol 4-1 %  patch 1 patch       Date Action Dose Route User    1/30/2024 0907 Patch Applied 1 patch Transdermal (Left Upper Abdomen) Nahomi Hobson RN          metoclopramide (Reglan) 5 mg/mL injection 10 mg       Date Action Dose Route User    1/30/2024 0007 Given 10 mg Intravenous Ester Reyes RN          pantoprazole (Protonix) DR tab 40 mg       Date Action Dose Route User    1/30/2024 0618 Given 40 mg Oral Ester Reyes RN          multivitamin (Tab-A-Tammie/Beta Carotene) tab 1 tablet       Date Action Dose Route User    1/30/2024 0907 Given 1 tablet Oral Nahomi Hobson RN          traMADol (Ultram) tab 50 mg       Date Action Dose Route User    1/29/2024 1425 Given 50 mg Oral Nahomi Hobson RN            Vitals (last day)       Date/Time Temp Pulse Resp BP SpO2 Weight O2 Device O2 Flow Rate (L/min) [facility]    01/30/24 1138 97.7 °F (36.5 °C) 81 18 153/67 99 % -- None (Room air) --     01/30/24 0746 98.5 °F (36.9 °C) 81 18 159/89 98 % -- None (Room air) --     01/30/24 0538 97.6 °F (36.4 °C) 92 18 156/93 97 % -- None (Room air) --     01/30/24 0100 -- -- -- 159/70 -- -- -- --     01/29/24 2350 98.2 °F (36.8 °C) 76 18 -- 94 % -- None (Room air) --     01/29/24 2100 98.2 °F (36.8 °C) 90 18 152/87 97 % -- None (Room air) --     01/29/24 1615 97.7 °F (36.5 °C) 83 -- 154/82 99 % -- None (Room air) --     01/29/24 1615 -- -- 18 -- -- -- -- --     01/29/24 1130 98 °F (36.7 °C) 77 19 150/81 100 % -- None (Room air) --     01/29/24 1027 -- 84 -- 159/78 -- -- -- -- AH    01/29/24 1015 -- 75 -- 183/113 -- -- -- -- CM    01/29/24 0745 -- 72 -- 164/86 99 % -- -- -- AW    01/29/24 0730 97.8 °F (36.6 °C) 75 18 173/95 100 % -- None (Room air) -- AW    01/29/24 0615 98 °F (36.7 °C) 74 18 174/78 93 % -- None (Room air) -- KM    01/29/24 0030 -- -- -- 161/68 -- -- -- -- LD    01/29/24 0008 -- 85 -- 165/81 -- -- -- -- LD

## 2024-01-30 NOTE — DISCHARGE SUMMARY
[place] HOSPITALIST  DISCHARGE SUMMARY     Sureshchan P Gondalia Patient Status:  Inpatient    1945 MRN [id number]   Location [facility] 5NW-A Attending No att. providers found   Hosp Day # 4 PCP Nannette Maza MD     Date of Admission: 2024  Date of Discharge: 2024  Discharge Disposition: Home Health Care Services    Discharge Diagnosis:   Right-sided pneumonia  Acute on chronic hyponatremia, improved  Generalized weakness  Essential hypertension  Moderate tricuspid regurgitation  Dyslipidemia  GERD  Mildly elevated LFTs, improved  Microcytic anemia with iron deficiency  History of paroxysmal atrial fibrillation with prior cardioversion  Memory loss  Mitral regurgitation with bioprosthesis valve    History of Present Illness: Sureshchan P Gondalia is a 78 year old male with PMhx of DL, HTN, presents with SOB. Pt has had symptoms for the past few weeks. He has some assocaited dizziness. He denies any CP. He has cough as well. No F/C. No N/V/D/C or abd pain. He has not been eating much over the past few days. He states he had some rice and soup yesterday and some ensure.  No recent travel or sick contacts he is aware of. He denies any focal weakness, numbness or tingling. No vision changes or headaches. He is very weak at home and has not been ambulating much. He denies any leg edema.     Brief Synopsis: Patient was started on Unasyn and azithromycin for right-sided pneumonia.  Procalcitonin was negative but imaging was concerning for it.  RVP negative.  MRSA nares and urine/Legionella urine antigens negative.  He was on room air the entire hospitalization.  Nephrology consulted for acute on chronic hyponatremia.  This improved with gentle IV fluids, loop diuretics and fluid restriction.  He was discharged on oral antibiotics with outpatient follow-up with PCP.    Lace+ Score: 62  59-90 High Risk  29-58 Medium Risk  0-28   Low Risk       TCM Follow-Up Recommendation:  LACE > 58: High Risk of  readmission after discharge from the hospital.    Procedures during hospitalization:   None    Incidental or significant findings and recommendations (brief descriptions):  None    Lab/Test results pending at Discharge:   None    Consultants:  Nephrology    Discharge Medication List:     Discharge Medications        START taking these medications        Instructions Prescription details   amoxicillin clavulanate 875-125 MG Tabs  Commonly known as: Augmentin      Take 1 tablet (875 mg total) by mouth every 12 (twelve) hours for 6 days.   Stop taking on: February 5, 2024  Quantity: 12 tablet  Refills: 0     benzonatate 100 MG Caps  Commonly known as: Tessalon      Take 1 capsule (100 mg total) by mouth 3 (three) times daily as needed for cough.   Quantity: 15 capsule  Refills: 0     traMADol 50 MG Tabs  Commonly known as: Ultram      Take 1 tablet (50 mg total) by mouth every 6 (six) hours as needed for Pain.   Quantity: 20 tablet  Refills: 0            CHANGE how you take these medications        Instructions Prescription details   enalapril 10 MG Tabs  Commonly known as: Vasotec  Start taking on: January 31, 2024  What changed:   medication strength  how much to take      Take 1 tablet (10 mg total) by mouth daily.   Quantity: 30 tablet  Refills: 0            CONTINUE taking these medications        Instructions Prescription details   atorvastatin 40 MG Tabs  Commonly known as: Lipitor      Take 1 tablet (40 mg total) by mouth As Directed.   Refills: 0     Centrum Adults Tabs      Take by mouth daily.   Refills: 0     cholecalciferol 125 MCG (5000 UT) Tabs  Commonly known as: Vitamin D3      Take 1 tablet (5,000 Units total) by mouth 2 (two) times daily.   Refills: 0     Coenzyme Q10 300 MG Caps      Take by mouth.   Refills: 0     LORazepam 1 MG Tabs  Commonly known as: Ativan      Take 1 tablet (1 mg total) by mouth as needed.   Refills: 0     Melatonin 10 MG Caps      Take 10 mg by mouth nightly as needed  (insomnia).   Refills: 0     Meloxicam 15 MG Tabs       Refills: 0     omega-3-acid ethyl esters 1 g Caps  Commonly known as: Lovaza      Take 2 capsules (2 g total) by mouth 2 (two) times daily.   Refills: 0     pantoprazole 40 MG Tbec  Commonly known as: Protonix      TAKE 1 TABLET BY MOUTH TWO TIMES A DAY 30 MINUTES BEFORE MEALS   Quantity: 180 tablet  Refills: 0     tiZANidine HCl 4 MG Caps  Commonly known as: ZANAFLEX      Take 1 capsule (4 mg total) by mouth 3 (three) times daily.   Refills: 0            STOP taking these medications      Esomeprazole Magnesium 40 MG Cpdr  Commonly known as: NEXIUM                  Where to Get Your Medications        These medications were sent to [facility] #[id number] - [place], [place] - [facility], [phone], [phone]  [address], [facility] [address]      Phone: [phone]   amoxicillin clavulanate 875-125 MG Tabs  benzonatate 100 MG Caps  enalapril 10 MG Tabs  traMADol 50 MG Tabs         [place]    Follow-up appointment:   Nannette Maza MD  [address] [place] [id number] [phone]    Follow up in 1 week(s)      Appointments for Next 30 Days 1/30/2024 - 2/29/2024      None            Vital signs:  Temp:  [97.6 °F (36.4 °C)-98.5 °F (36.9 °C)] 97.7 °F (36.5 °C)  Pulse:  [76-92] 81  Resp:  [18] 18  BP: (152-159)/(67-93) 153/67  SpO2:  [94 %-99 %] 99 %    Physical Exam:    See progress note dated same day.  -----------------------------------------------------------------------------------------------  PATIENT DISCHARGE INSTRUCTIONS: See electronic chart    Bacilio Weaver DO    Time spent:  32 minutes

## 2024-01-30 NOTE — PLAN OF CARE
AOX4, VSS. Afebrile. Seizure precautions in place. RA . Tele NSR. Heparin subcut. PO ABX. Up IND in room. Reporting pain with coughing. Refused prn pain medication. Pt reported nausea, PRN given with relief. Updated pt on POC for NOC. Will follow  Problem: Patient/Family Goals  Goal: Patient/Family Long Term Goal  Description: Patient's Long Term Goal: Home     Interventions:  - Abx, IVF, labs   - See additional Care Plan goals for specific interventions  Outcome: Progressing  Goal: Patient/Family Short Term Goal  Description: Patient's Short Term Goal: electrolyte balance   1/27 noc: improve sodium   1/28 NOC: sleep well, improve pain     Interventions:   - FR, coverage, nephrology on consult   - See additional Care Plan goals for specific interventions  Outcome: Progressing     Problem: RESPIRATORY - ADULT  Goal: Achieves optimal ventilation and oxygenation  Description: INTERVENTIONS:  - Assess for changes in respiratory status  - Assess for changes in mentation and behavior  - Position to facilitate oxygenation and minimize respiratory effort  - Oxygen supplementation based on oxygen saturation or ABGs  - Provide Smoking Cessation handout, if applicable  - Encourage broncho-pulmonary hygiene including cough, deep breathe, Incentive Spirometry  - Assess the need for suctioning and perform as needed  - Assess and instruct to report SOB or any respiratory difficulty  - Respiratory Therapy support as indicated  - Manage/alleviate anxiety  - Monitor for signs/symptoms of CO2 retention  Outcome: Progressing     Problem: PAIN - ADULT  Goal: Verbalizes/displays adequate comfort level or patient's stated pain goal  Description: INTERVENTIONS:  - Encourage pt to monitor pain and request assistance  - Assess pain using appropriate pain scale  - Administer analgesics based on type and severity of pain and evaluate response  - Implement non-pharmacological measures as appropriate and evaluate response  - Consider cultural  and social influences on pain and pain management  - Manage/alleviate anxiety  - Utilize distraction and/or relaxation techniques  - Monitor for opioid side effects  - Notify MD/LIP if interventions unsuccessful or patient reports new pain  - Anticipate increased pain with activity and pre-medicate as appropriate  Outcome: Progressing     Problem: SAFETY ADULT - FALL  Goal: Free from fall injury  Description: INTERVENTIONS:  - Assess pt frequently for physical needs  - Identify cognitive and physical deficits and behaviors that affect risk of falls.  - [place] fall precautions as indicated by assessment.  - Educate pt/family on patient safety including physical limitations  - Instruct pt to call for assistance with activity based on assessment  - Modify environment to reduce risk of injury  - Provide assistive devices as appropriate  - Consider OT/PT consult to assist with strengthening/mobility  - Encourage toileting schedule  Outcome: Progressing     Problem: DISCHARGE PLANNING  Goal: Discharge to home or other facility with appropriate resources  Description: INTERVENTIONS:  - Identify barriers to discharge w/pt and caregiver  - Include patient/family/discharge partner in discharge planning  - Arrange for needed discharge resources and transportation as appropriate  - Identify discharge learning needs (meds, wound care, etc)  - Arrange for interpreters to assist at discharge as needed  - Consider post-discharge preferences of patient/family/discharge partner  - Complete POLST form as appropriate  - Assess patient's ability to be responsible for managing their own health  - Refer to Case Management Department for coordinating discharge planning if the patient needs post-hospital services based on physician/LIP order or complex needs related to functional status, cognitive ability or social support system  Outcome: Progressing

## 2024-01-30 NOTE — PLAN OF CARE
NURSING DISCHARGE NOTE    Discharged Home via Wheelchair.  Accompanied by Support staff  Belongings Taken by patient/family.    Patient discharged in stable condition with daughter. AVS reviewed, questions answered. Reviewed AVS including new medication prescriptions to be picked up, medication change, follow up appointment, home health. IV and tele removed. Bedside belongings taken.

## 2024-02-09 ENCOUNTER — LAB REQUISITION (OUTPATIENT)
Dept: LAB | Age: 79
End: 2024-02-09
Payer: MEDICARE

## 2024-02-09 DIAGNOSIS — E87.1 HYPO-OSMOLALITY AND HYPONATREMIA: ICD-10-CM

## 2024-02-09 LAB
ANION GAP SERPL CALC-SCNC: 6 MMOL/L (ref 0–18)
BASOPHILS # BLD AUTO: 0.05 X10(3) UL (ref 0–0.2)
BASOPHILS NFR BLD AUTO: 1 %
BUN BLD-MCNC: 21 MG/DL (ref 9–23)
CALCIUM BLD-MCNC: 9.5 MG/DL (ref 8.5–10.1)
CHLORIDE SERPL-SCNC: 100 MMOL/L (ref 98–112)
CO2 SERPL-SCNC: 28 MMOL/L (ref 21–32)
CREAT BLD-MCNC: 0.8 MG/DL
EGFRCR SERPLBLD CKD-EPI 2021: 91 ML/MIN/1.73M2 (ref 60–?)
EOSINOPHIL # BLD AUTO: 0.18 X10(3) UL (ref 0–0.7)
EOSINOPHIL NFR BLD AUTO: 3.6 %
ERYTHROCYTE [DISTWIDTH] IN BLOOD BY AUTOMATED COUNT: 18 %
GLUCOSE BLD-MCNC: 88 MG/DL (ref 70–99)
HCT VFR BLD AUTO: 33.2 %
HGB BLD-MCNC: 10.6 G/DL
IMM GRANULOCYTES # BLD AUTO: 0.03 X10(3) UL (ref 0–1)
IMM GRANULOCYTES NFR BLD: 0.6 %
LYMPHOCYTES # BLD AUTO: 1.06 X10(3) UL (ref 1–4)
LYMPHOCYTES NFR BLD AUTO: 21.2 %
MCH RBC QN AUTO: 27.8 PG (ref 26–34)
MCHC RBC AUTO-ENTMCNC: 31.9 G/DL (ref 31–37)
MCV RBC AUTO: 87.1 FL
MONOCYTES # BLD AUTO: 0.43 X10(3) UL (ref 0.1–1)
MONOCYTES NFR BLD AUTO: 8.6 %
NEUTROPHILS # BLD AUTO: 3.26 X10 (3) UL (ref 1.5–7.7)
NEUTROPHILS # BLD AUTO: 3.26 X10(3) UL (ref 1.5–7.7)
NEUTROPHILS NFR BLD AUTO: 65 %
OSMOLALITY SERPL CALC.SUM OF ELEC: 280 MOSM/KG (ref 275–295)
PLATELET # BLD AUTO: 237 10(3)UL (ref 150–450)
POTASSIUM SERPL-SCNC: 4.5 MMOL/L (ref 3.5–5.1)
RBC # BLD AUTO: 3.81 X10(6)UL
SODIUM SERPL-SCNC: 134 MMOL/L (ref 136–145)
WBC # BLD AUTO: 5 X10(3) UL (ref 4–11)

## 2024-02-09 PROCEDURE — 80048 BASIC METABOLIC PNL TOTAL CA: CPT | Performed by: INTERNAL MEDICINE

## 2024-02-09 PROCEDURE — 85025 COMPLETE CBC W/AUTO DIFF WBC: CPT | Performed by: INTERNAL MEDICINE
